# Patient Record
Sex: FEMALE | Race: WHITE | NOT HISPANIC OR LATINO | Employment: UNEMPLOYED | URBAN - METROPOLITAN AREA
[De-identification: names, ages, dates, MRNs, and addresses within clinical notes are randomized per-mention and may not be internally consistent; named-entity substitution may affect disease eponyms.]

---
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
## 2019-01-31 ENCOUNTER — OFFICE VISIT - HEALTHEAST (OUTPATIENT)
Dept: FAMILY MEDICINE | Facility: CLINIC | Age: 19
End: 2019-01-31

## 2019-01-31 DIAGNOSIS — J02.9 VIRAL PHARYNGITIS: ICD-10-CM

## 2019-01-31 DIAGNOSIS — J02.9 SORE THROAT: ICD-10-CM

## 2019-01-31 LAB — DEPRECATED S PYO AG THROAT QL EIA: NORMAL

## 2019-01-31 RX ORDER — DICYCLOMINE HYDROCHLORIDE 10 MG/1
CAPSULE ORAL PRN
Refills: 0 | Status: SHIPPED | COMMUNITY
Start: 2018-07-01 | End: 2022-08-09

## 2019-02-01 LAB — GROUP A STREP BY PCR: NORMAL

## 2019-03-18 ENCOUNTER — OFFICE VISIT - HEALTHEAST (OUTPATIENT)
Dept: FAMILY MEDICINE | Facility: CLINIC | Age: 19
End: 2019-03-18

## 2019-03-18 DIAGNOSIS — N89.8 VAGINAL DISCHARGE: ICD-10-CM

## 2019-03-18 DIAGNOSIS — B37.31 YEAST VAGINITIS: ICD-10-CM

## 2019-03-18 DIAGNOSIS — Z30.42 ENCOUNTER FOR SURVEILLANCE OF INJECTABLE CONTRACEPTIVE: ICD-10-CM

## 2019-03-18 LAB
CLUE CELLS: ABNORMAL
TRICHOMONAS, WET PREP: ABNORMAL
YEAST, WET PREP: ABNORMAL

## 2019-03-18 ASSESSMENT — MIFFLIN-ST. JEOR: SCORE: 1295.45

## 2019-03-19 LAB
C TRACH DNA SPEC QL PROBE+SIG AMP: NEGATIVE
N GONORRHOEA DNA SPEC QL NAA+PROBE: NEGATIVE

## 2019-06-24 ENCOUNTER — AMBULATORY - HEALTHEAST (OUTPATIENT)
Dept: NURSING | Facility: CLINIC | Age: 19
End: 2019-06-24

## 2019-09-16 ENCOUNTER — AMBULATORY - HEALTHEAST (OUTPATIENT)
Dept: NURSING | Facility: CLINIC | Age: 19
End: 2019-09-16

## 2019-12-13 ENCOUNTER — OFFICE VISIT - HEALTHEAST (OUTPATIENT)
Dept: FAMILY MEDICINE | Facility: CLINIC | Age: 19
End: 2019-12-13

## 2019-12-13 DIAGNOSIS — B37.31 YEAST VAGINITIS: ICD-10-CM

## 2019-12-13 DIAGNOSIS — Z30.42 ENCOUNTER FOR SURVEILLANCE OF INJECTABLE CONTRACEPTIVE: ICD-10-CM

## 2019-12-13 DIAGNOSIS — Z20.2 POSSIBLE EXPOSURE TO STD: ICD-10-CM

## 2019-12-13 LAB
CLUE CELLS: ABNORMAL
TRICHOMONAS, WET PREP: ABNORMAL
YEAST, WET PREP: ABNORMAL

## 2019-12-16 LAB
C TRACH DNA SPEC QL PROBE+SIG AMP: NEGATIVE
N GONORRHOEA DNA SPEC QL NAA+PROBE: NEGATIVE

## 2020-05-04 ENCOUNTER — OFFICE VISIT - HEALTHEAST (OUTPATIENT)
Dept: FAMILY MEDICINE | Facility: CLINIC | Age: 20
End: 2020-05-04

## 2020-05-04 DIAGNOSIS — A08.11 GASTROENTERITIS DUE TO NOROVIRUS: ICD-10-CM

## 2020-05-04 RX ORDER — LORAZEPAM 0.5 MG/1
0.5 TABLET ORAL EVERY 6 HOURS PRN
Qty: 10 TABLET | Refills: 0 | Status: SHIPPED | OUTPATIENT
Start: 2020-05-04 | End: 2021-09-29

## 2020-05-05 ENCOUNTER — AMBULATORY - HEALTHEAST (OUTPATIENT)
Dept: LAB | Facility: CLINIC | Age: 20
End: 2020-05-05

## 2020-05-05 ENCOUNTER — AMBULATORY - HEALTHEAST (OUTPATIENT)
Dept: FAMILY MEDICINE | Facility: CLINIC | Age: 20
End: 2020-05-05

## 2020-05-05 DIAGNOSIS — A08.11 GASTROENTERITIS DUE TO NOROVIRUS: ICD-10-CM

## 2020-05-05 LAB
ANION GAP SERPL CALCULATED.3IONS-SCNC: 11 MMOL/L (ref 5–18)
BUN SERPL-MCNC: 11 MG/DL (ref 8–22)
CALCIUM SERPL-MCNC: 9.5 MG/DL (ref 8.5–10.5)
CHLORIDE BLD-SCNC: 102 MMOL/L (ref 98–107)
CO2 SERPL-SCNC: 23 MMOL/L (ref 22–31)
CREAT SERPL-MCNC: 0.73 MG/DL (ref 0.6–1.1)
GFR SERPL CREATININE-BSD FRML MDRD: >60 ML/MIN/1.73M2
GLUCOSE BLD-MCNC: 79 MG/DL (ref 70–125)
MAGNESIUM SERPL-MCNC: 2.2 MG/DL (ref 1.8–2.6)
POTASSIUM BLD-SCNC: 4.1 MMOL/L (ref 3.5–5)
SODIUM SERPL-SCNC: 136 MMOL/L (ref 136–145)

## 2020-06-02 ENCOUNTER — COMMUNICATION - HEALTHEAST (OUTPATIENT)
Dept: FAMILY MEDICINE | Facility: CLINIC | Age: 20
End: 2020-06-02

## 2020-06-04 ENCOUNTER — OFFICE VISIT - HEALTHEAST (OUTPATIENT)
Dept: FAMILY MEDICINE | Facility: CLINIC | Age: 20
End: 2020-06-04

## 2020-06-04 DIAGNOSIS — A08.11 GASTROENTERITIS DUE TO NOROVIRUS: ICD-10-CM

## 2020-06-04 DIAGNOSIS — K29.70 GASTRITIS WITHOUT BLEEDING, UNSPECIFIED CHRONICITY, UNSPECIFIED GASTRITIS TYPE: ICD-10-CM

## 2020-06-04 RX ORDER — METOCLOPRAMIDE 10 MG/1
TABLET ORAL
Qty: 20 TABLET | Refills: 0 | Status: SHIPPED | OUTPATIENT
Start: 2020-06-04 | End: 2021-09-29

## 2020-06-22 ENCOUNTER — AMBULATORY - HEALTHEAST (OUTPATIENT)
Dept: CARE COORDINATION | Facility: CLINIC | Age: 20
End: 2020-06-22

## 2020-06-22 ENCOUNTER — COMMUNICATION - HEALTHEAST (OUTPATIENT)
Dept: NURSING | Facility: CLINIC | Age: 20
End: 2020-06-22

## 2020-06-22 DIAGNOSIS — Z65.9 PSYCHOSOCIAL PROBLEM: ICD-10-CM

## 2020-07-01 ENCOUNTER — COMMUNICATION - HEALTHEAST (OUTPATIENT)
Dept: CARE COORDINATION | Facility: CLINIC | Age: 20
End: 2020-07-01

## 2020-07-06 ENCOUNTER — COMMUNICATION - HEALTHEAST (OUTPATIENT)
Dept: NURSING | Facility: CLINIC | Age: 20
End: 2020-07-06

## 2020-07-17 ENCOUNTER — AMBULATORY - HEALTHEAST (OUTPATIENT)
Dept: CARE COORDINATION | Facility: CLINIC | Age: 20
End: 2020-07-17

## 2020-07-17 DIAGNOSIS — Z78.9 HEALTH CARE HOME, ACTIVE CARE COORDINATION: ICD-10-CM

## 2020-07-20 ENCOUNTER — COMMUNICATION - HEALTHEAST (OUTPATIENT)
Dept: NURSING | Facility: CLINIC | Age: 20
End: 2020-07-20

## 2020-07-24 ENCOUNTER — VIRTUAL VISIT (OUTPATIENT)
Dept: FAMILY MEDICINE | Facility: OTHER | Age: 20
End: 2020-07-24

## 2020-07-26 NOTE — PROGRESS NOTES
"Date: 2020 14:18:48  Clinician: Rich Hopkins  Clinician NPI: [id number]  Patient: Sydney Mcintosh  Patient : 2000  Patient Address: [facility] [address], [place] [address]  Patient Phone: [phone]  Visit Protocol: URI  Patient Summary:  Sydney is a 20 year old ( : 2000 ) female who initiated a Visit for COVID-19 (Coronavirus) evaluation and screening. When asked the question \"Please sign me up to receive news, health information and promotions. \", Sydney responded \"No\".    Sydney states her symptoms started 1-2 days ago.   Her symptoms consist of nausea, diarrhea, myalgia, a sore throat, a cough, nasal congestion, rhinitis, malaise, and a headache. She is experiencing difficulty breathing due to nasal congestion but she is not short of breath.   Symptom details     Nasal secretions: The color of her mucus is green.    Cough: Sydney coughs every 5-10 minutes and her cough is more bothersome at night. Phlegm comes into her throat when she coughs. She does not believe her cough is caused by post-nasal drip. The color of the phlegm is green.     Sore throat: Sydney reports having unbearable throat pain (10 on a 10 point pain scale), does not have exudate on her tonsils, and can swallow liquids. She is not sure if the lymph nodes in her neck are enlarged. A rash has not appeared on the skin since the sore throat started.     Headache: She states the headache is severe (7-9 on a 10 point pain scale).      Sydney denies having wheezing, teeth pain, ageusia, anosmia, facial pain or pressure, fever, vomiting, ear pain, and chills. She also denies having recent facial or sinus surgery in the past 60 days, taking antibiotic medication in the past month, and having a sinus infection within the past year.   Precipitating events  Within the past week, Sydney has not been exposed to someone with strep throat. She has not recently been exposed to someone with influenza. Sydney has been in close contact " with the following high risk individuals: pregnant women and children under the age of 5.   Pertinent COVID-19 (Coronavirus) information  In the past 14 days, Sydney has not worked in a congregate living setting.   She does not work or volunteer as healthcare worker or a  and does not work or volunteer in a healthcare facility.   Sydney also has not lived in a congregate living setting in the past 14 days. She does not live with a healthcare worker.   Sydney has not had a close contact with a laboratory-confirmed COVID-19 patient within 14 days of symptom onset.   Pertinent medical history  Sydney typically gets a yeast infection when she takes antibiotics. She has used fluconazole (Diflucan) to treat previous yeast infections. 1 dose of fluconazole (Diflucan) has typically been sufficient for symptoms to resolve in the past.   Syndey does not need a return to work/school note.   Weight: 130 lbs   Sydney does not smoke or use smokeless tobacco.   She denies pregnancy and denies breastfeeding. Her last period was over a month ago.   Weight: 130 lbs    MEDICATIONS: No current medications, ALLERGIES: amoxicillin  Clinician Response:  Dear Sydney,   Your symptoms show that you may have coronavirus (COVID-19). This illness can cause fever, cough and trouble breathing. Many people get a mild case and get better on their own. Some people can get very sick.  What should I do?  We would like to test you for this virus.   1. Please call [phone] to schedule your visit. Explain that you were referred by [facility] to have a COVID-19 test. Be ready to share your [facility] visit ID number.  The following will serve as your written order for this COVID Test, ordered by me, for the indication of suspected COVID [Z20.828]: The test will be ordered in [facility], our electronic health record, after you are scheduled. It will show as ordered and authorized by David Georges MD.  Order: COVID-19 (Coronavirus) PCR for SYMPTOMATIC  "testing from [facility].      2. When it's time for your COVID test:  Stay at least 6 feet away from others. (If someone will drive you to your test, stay in the backseat, as far away from the  as you can.)   Cover your mouth and nose with a mask, tissue or washcloth.  Go straight to the testing site. Don't make any stops on the way there or back.      3.Starting now: Stay home and away from others (self-isolate) until:   You've had no fever---and no medicine that reduces fever---for 3 full days (72 hours). And...   Your other symptoms have gotten better. For example, your cough or breathing has improved. And...   At least 10 days have passed since your symptoms started.       During this time, don't leave the house except for testing or medical care.   Stay in your own room, even for meals. Use your own bathroom if you can.   Stay away from others in your home. No hugging, kissing or shaking hands. No visitors.  Don't go to work, school or anywhere else.    Clean \"high touch\" surfaces often (doorknobs, counters, handles, etc.). Use a household cleaning spray or wipes. You'll find a full list of  on the EPA website: www.epa.gov/pesticide-registration/list-n-disinfectants-use-against-sars-cov-2.   Cover your mouth and nose with a mask, tissue or washcloth to avoid spreading germs.  Wash your hands and face often. Use soap and water.  Caregivers in these groups are at risk for severe illness due to COVID-19:  o People 65 years and older  o People who live in a nursing home or long-term care facility  o People with chronic disease (lung, heart, cancer, diabetes, kidney, liver, immunologic)  o People who have a weakened immune system, including those who:   Are in cancer treatment  Take medicine that weakens the immune system, such as corticosteroids  Had a bone marrow or organ transplant  Have an immune deficiency  Have poorly controlled HIV or AIDS  Are obese (body mass index of 40 or higher)  Smoke " regularly   o Caregivers should wear gloves while washing dishes, handling laundry and cleaning bedrooms and bathrooms.  o Use caution when washing and drying laundry: Don't shake dirty laundry, and use the warmest water setting that you can.  o For more tips, go to www.cdc.gov/coronavirus/2019-ncov/downloads/10Things.pdf.    4.Sign up for [employer]. We know it's scary to hear that you might have COVID-19. We want to track your symptoms to make sure you're okay over the next 2 weeks. Please look for an email from [employer]---this is a free, online program that we'll use to keep in touch. To sign up, follow the link in the email. Learn more at [email or web address]  How can I take care of myself?   Get lots of rest. Drink extra fluids (unless a doctor has told you not to).   Take Tylenol (acetaminophen) for fever or pain. If you have liver or kidney problems, ask your family doctor if it's okay to take Tylenol.   Adults can take either:    650 mg (two 325 mg pills) every 4 to 6 hours, or...   1,000 mg (two 500 mg pills) every 8 hours as needed.    Note: Don't take more than 3,000 mg in one day. Acetaminophen is found in many medicines (both prescribed and over-the-counter medicines). Read all labels to be sure you don't take too much.   For children, check the Tylenol bottle for the right dose. The dose is based on the child's age or weight.    If you have other health problems (like cancer, heart failure, an organ transplant or severe kidney disease): Call your specialty clinic if you don't feel better in the next 2 days.       Know when to call 911. Emergency warning signs include:    Trouble breathing or shortness of breath Pain or pressure in the chest that doesn't go away Feeling confused like you haven't felt before, or not being able to wake up Bluish-colored lips or face.  Where can I get more information?    [employer] [place] -- About COVID-19: [email or web address]   CDC -- What  "to Do If You're Sick: www.cdc.gov/coronavirus/2019-ncov/about/steps-when-sick.html   CDC -- Ending Home Isolation: www.cdc.gov/coronavirus/2019-ncov/hcp/disposition-in-home-patients.html   [facility] -- Caring for Someone: www.cdc.gov/coronavirus/2019-ncov/if-you-are-sick/care-for-someone.html   [facility] -- Interim Guidance for Hospital Discharge to Home: www.health.[facility].[place]./diseases/coronavirus/hcp/hospdischarge.pdf   [facility] clinical trials (COVID-19 research studies): clinicalaffairs.[facility]-clinical-trials    Below are the COVID-19 hotlines at the [place] Department of Health ([facility]). Interpreters are available.    For health questions: Call 191-463-8803 or 1-487.759.8894 (7 a.m. to 7 p.m.) For questions about schools and childcare: Call 648-321-4573 or 1-707.870.3436 (7 a.m. to 7 p.m.)       Rapid Strep Test - [place]    I am sorry you are not feeling well. Based on the information provided, it is possible you have a bacterial infection called strep throat. When left untreated, strep can spread to other areas of the body and cause a more serious infection.  You will need to have a strep test before your diagnosis and treatment plan can be completed. A swab of the back of your throat is collected in a lab and tested for the bacteria that causes strep.  I provided a ZipTicket (lab order) to have the test done in a lab, so you can receive your treatment plan as soon as possible.  Click \"Continue zipticket\" button below to get started.  Instructions:     Once you have clicked \"Continue zipticket\" below, you will be prompted to select the most convenient clinic on the next screen    Once you select a clinic of your choosing, click \"Activate\" button to activate the lab order    Print out or write down the check-in code    Bring the check-in code to the clinic    Check in at the  (an appointment is not needed)    Depending on the number of patients at the lab, the visit should take roughly 10-15 " minutes     You will receive an email letting you know when your lab results and treatment plan are available.   Diagnosis: Pain in throat  Diagnosis ICD: R07.0  ZipTicket Results: Rapid Strep Test - [place] - Activation needed  [place] Secondary Results: null

## 2020-07-29 NOTE — PROGRESS NOTES
"Date: 2020 14:18:48  Clinician: Rich Hopkins  Clinician NPI: [id number]  Patient: Sydney Mcintosh  Patient : 2000  Patient Address: [facility] [address], [place] [address]  Patient Phone: [phone]  Visit Protocol: URI  Patient Summary:  Sydney is a 20 year old ( : 2000 ) female who initiated a Visit for COVID-19 (Coronavirus) evaluation and screening. When asked the question \"Please sign me up to receive news, health information and promotions. \", Sydney responded \"No\".    Sydney states her symptoms started 1-2 days ago.   Her symptoms consist of nausea, diarrhea, myalgia, a sore throat, a cough, nasal congestion, rhinitis, malaise, and a headache. She is experiencing difficulty breathing due to nasal congestion but she is not short of breath.   Symptom details     Nasal secretions: The color of her mucus is green.    Cough: Sydney coughs every 5-10 minutes and her cough is more bothersome at night. Phlegm comes into her throat when she coughs. She does not believe her cough is caused by post-nasal drip. The color of the phlegm is green.     Sore throat: Sydney reports having unbearable throat pain (10 on a 10 point pain scale), does not have exudate on her tonsils, and can swallow liquids. She is not sure if the lymph nodes in her neck are enlarged. A rash has not appeared on the skin since the sore throat started.     Headache: She states the headache is severe (7-9 on a 10 point pain scale).      Sydney denies having wheezing, teeth pain, ageusia, anosmia, facial pain or pressure, fever, vomiting, ear pain, and chills. She also denies having recent facial or sinus surgery in the past 60 days, taking antibiotic medication in the past month, and having a sinus infection within the past year.   Precipitating events  Within the past week, Sydney has not been exposed to someone with strep throat. She has not recently been exposed to someone with influenza. Sydney has been in close contact " with the following high risk individuals: pregnant women and children under the age of 5.   Pertinent COVID-19 (Coronavirus) information  In the past 14 days, Sydney has not worked in a congregate living setting.   She does not work or volunteer as healthcare worker or a  and does not work or volunteer in a healthcare facility.   Sydney also has not lived in a congregate living setting in the past 14 days. She does not live with a healthcare worker.   Sydney has not had a close contact with a laboratory-confirmed COVID-19 patient within 14 days of symptom onset.   Pertinent medical history  Sydney typically gets a yeast infection when she takes antibiotics. She has used fluconazole (Diflucan) to treat previous yeast infections. 1 dose of fluconazole (Diflucan) has typically been sufficient for symptoms to resolve in the past.   Sydney does not need a return to work/school note.   Weight: 130 lbs   Sydney does not smoke or use smokeless tobacco.   She denies pregnancy and denies breastfeeding. Her last period was over a month ago.   Weight: 130 lbs    MEDICATIONS: No current medications, ALLERGIES: amoxicillin  Clinician Response:  Dear Sydney,   Your symptoms show that you may have coronavirus (COVID-19). This illness can cause fever, cough and trouble breathing. Many people get a mild case and get better on their own. Some people can get very sick.  What should I do?  We would like to test you for this virus.   1. Please call [phone] to schedule your visit. Explain that [facility] were [facility] by [facility] to have a COVID-19 test. Be ready to share your [facility] visit ID number.  The following will serve as your written order for this COVID Test, ordered by me, for the indication of suspected COVID [Z20.828]: The test will be ordered in [facility], our electronic health record, after you are scheduled. It will show as ordered and authorized by David Georges MD.  Order: COVID-19 (Coronavirus) PCR for SYMPTOMATIC  "testing from [facility].      2. When it's time for your COVID test:  Stay at least 6 feet away from others. (If someone will drive you to your test, stay in the backseat, as far away from the  as you can.)   Cover your mouth and nose with a mask, tissue or washcloth.  Go straight to the testing site. Don't make any stops on the way there or back.      3.Starting now: Stay home and away from others (self-isolate) until:   You've had no fever---and no medicine that reduces fever---for 3 full days (72 hours). And...   Your other symptoms have gotten better. For example, your cough or breathing has improved. And...   At least 10 days have passed since your symptoms started.       During this time, don't leave the house except for testing or medical care.   Stay in your own room, even for meals. Use your own bathroom if you can.   Stay away from others in your home. No hugging, kissing or shaking hands. No visitors.  Don't go to work, school or anywhere else.    Clean \"high touch\" surfaces often (doorknobs, counters, handles, etc.). Use a household cleaning spray or wipes. You'll find a full list of  on the EPA website: www.epa.gov/pesticide-registration/list-n-disinfectants-use-against-sars-cov-2.   Cover your mouth and nose with a mask, tissue or washcloth to avoid spreading germs.  Wash your hands and face often. Use soap and water.  Caregivers in these groups are at risk for severe illness due to COVID-19:  o People 65 years and older  o People who live in a nursing home or long-term care facility  o People with chronic disease (lung, heart, cancer, diabetes, kidney, liver, immunologic)  o People who have a weakened immune system, including those who:   Are in cancer treatment  Take medicine that weakens the immune system, such as corticosteroids  Had a bone marrow or organ transplant  Have an immune deficiency  Have poorly controlled HIV or AIDS  Are obese (body mass index of 40 or higher)  Smoke " regularly   o Caregivers should wear gloves while washing dishes, handling laundry and cleaning bedrooms and bathrooms.  o Use caution when washing and drying laundry: Don't shake dirty laundry, and use the warmest water setting that you can.  o For more tips, go to www.cdc.gov/coronavirus/2019-ncov/downloads/10Things.pdf.    4.Sign up for [employer]. We know it's scary to hear that you might have COVID-19. We want to track your symptoms to make sure you're okay over the next 2 weeks. Please look for an email from [employer]---this is a free, online program that we'll use to keep in touch. To sign up, follow the link in the email. Learn more at [email or web address]  How can I take care of myself?   Get lots of rest. Drink extra fluids (unless a doctor has told you not to).   Take Tylenol (acetaminophen) for fever or pain. If you have liver or kidney problems, ask your family doctor if it's okay to take Tylenol.   Adults can take either:    650 mg (two 325 mg pills) every 4 to 6 hours, or...   1,000 mg (two 500 mg pills) every 8 hours as needed.    Note: Don't take more than 3,000 mg in one day. Acetaminophen is found in many medicines (both prescribed and over-the-counter medicines). Read all labels to be sure you don't take too much.   For children, check the Tylenol bottle for the right dose. The dose is based on the child's age or weight.    If you have other health problems (like cancer, heart failure, an organ transplant or severe kidney disease): Call your specialty clinic if you don't feel better in the next 2 days.       Know when to call 911. Emergency warning signs include:    Trouble breathing or shortness of breath Pain or pressure in the chest that doesn't go away Feeling confused like you haven't felt before, or not being able to wake up Bluish-colored lips or face.  Where can I get more information?    [employer] [place] -- About COVID-19: [email or web address]   CDC -- What  to Do If You're Sick: www.cdc.gov/coronavirus/2019-ncov/about/steps-when-sick.html   CDC -- Ending Home Isolation: www.cdc.gov/coronavirus/2019-ncov/hcp/disposition-in-home-patients.html   [facility] -- Caring for Someone: www.cdc.gov/coronavirus/2019-ncov/if-you-are-sick/care-for-someone.html   [facility] -- Interim Guidance for Hospital Discharge to Home: www.health.[facility].[place]./diseases/coronavirus/hcp/hospdischarge.pdf   [facility] clinical trials (COVID-19 research studies): clinicalaffairs.[facility]-clinical-trials    Below are the COVID-19 hotlines at the [place] Department of Health ([facility]). Interpreters are available.    For health questions: Call 249-209-7390 or 1-686.996.3464 (7 a.m. to 7 p.m.) For questions about schools and childcare: Call 427-843-6307 or 1-699.699.9274 (7 a.m. to 7 p.m.)       Rapid Strep Test - [place]    I am sorry you are not feeling well. Your strep test has . Based on the information provided, it is possible that you could have strep throat. When left untreated, strep can spread to other areas of the body and cause a more serious infection.  A strep test is the only way to determine if a bacterial infection or a virus is causing your sore throat. This is done in a lab where a swab of the back of your throat is collected tested for the bacteria that causes strep.  Since you chose not to use the lab order, please be seen:     In a clinic or urgent care    Within 24 hours     Call 911 or go to the emergency room if you suddenly develop a rash, are drooling or unable to swallow fluids, if you are having difficulty breathing, or feel that your throat is closing off.   Diagnosis: Pain in throat  Diagnosis ICD: R07.0  ZipTicket Results: Rapid Strep Test - [place] -   ZipTicket Secondary Results: null

## 2020-08-14 ENCOUNTER — AMBULATORY - HEALTHEAST (OUTPATIENT)
Dept: FAMILY MEDICINE | Facility: CLINIC | Age: 20
End: 2020-08-14

## 2020-08-14 DIAGNOSIS — Z30.40 CONTRACEPTION, GENERIC SURVEILLANCE: ICD-10-CM

## 2020-08-18 ENCOUNTER — AMBULATORY - HEALTHEAST (OUTPATIENT)
Dept: NURSING | Facility: CLINIC | Age: 20
End: 2020-08-18

## 2020-08-18 DIAGNOSIS — Z30.40 CONTRACEPTION, GENERIC SURVEILLANCE: ICD-10-CM

## 2020-08-18 LAB — HCG UR QL: NEGATIVE

## 2020-09-04 ENCOUNTER — AMBULATORY - HEALTHEAST (OUTPATIENT)
Dept: CARE COORDINATION | Facility: CLINIC | Age: 20
End: 2020-09-04

## 2020-09-04 DIAGNOSIS — Z78.9 HEALTH CARE HOME, ACTIVE CARE COORDINATION: ICD-10-CM

## 2020-09-08 ENCOUNTER — COMMUNICATION - HEALTHEAST (OUTPATIENT)
Dept: NURSING | Facility: CLINIC | Age: 20
End: 2020-09-08

## 2020-09-15 ENCOUNTER — COMMUNICATION - HEALTHEAST (OUTPATIENT)
Dept: CARE COORDINATION | Facility: CLINIC | Age: 20
End: 2020-09-15

## 2020-09-17 ENCOUNTER — OFFICE VISIT - HEALTHEAST (OUTPATIENT)
Dept: FAMILY MEDICINE | Facility: CLINIC | Age: 20
End: 2020-09-17

## 2020-09-17 DIAGNOSIS — K14.0 GLOSSITIS: ICD-10-CM

## 2020-09-17 DIAGNOSIS — R20.0 NUMBNESS OF FINGERS OF BOTH HANDS: ICD-10-CM

## 2020-09-21 ENCOUNTER — COMMUNICATION - HEALTHEAST (OUTPATIENT)
Dept: NURSING | Facility: CLINIC | Age: 20
End: 2020-09-21

## 2020-12-22 ENCOUNTER — OFFICE VISIT - HEALTHEAST (OUTPATIENT)
Dept: FAMILY MEDICINE | Facility: CLINIC | Age: 20
End: 2020-12-22

## 2020-12-22 DIAGNOSIS — R11.0 NAUSEA: ICD-10-CM

## 2020-12-22 DIAGNOSIS — Z30.42 ENCOUNTER FOR SURVEILLANCE OF INJECTABLE CONTRACEPTIVE: ICD-10-CM

## 2020-12-22 DIAGNOSIS — R55 VASOVAGAL SYNCOPE: ICD-10-CM

## 2020-12-22 DIAGNOSIS — N89.8 VAGINAL DISCHARGE: ICD-10-CM

## 2020-12-22 DIAGNOSIS — Z12.4 SCREENING FOR CERVICAL CANCER: ICD-10-CM

## 2020-12-22 LAB
CLUE CELLS: ABNORMAL
HCG UR QL: NEGATIVE
TRICHOMONAS, WET PREP: ABNORMAL
YEAST, WET PREP: ABNORMAL

## 2020-12-22 RX ORDER — ONDANSETRON 4 MG/1
4 TABLET, ORALLY DISINTEGRATING ORAL EVERY 12 HOURS PRN
Qty: 20 TABLET | Refills: 0 | Status: SHIPPED | OUTPATIENT
Start: 2020-12-22 | End: 2021-09-29

## 2020-12-22 ASSESSMENT — MIFFLIN-ST. JEOR: SCORE: 1364.06

## 2020-12-24 LAB
C TRACH DNA SPEC QL PROBE+SIG AMP: NEGATIVE
HPV SOURCE: ABNORMAL
HUMAN PAPILLOMA VIRUS 16 DNA: NEGATIVE
HUMAN PAPILLOMA VIRUS 18 DNA: NEGATIVE
HUMAN PAPILLOMA VIRUS FINAL DIAGNOSIS: ABNORMAL
HUMAN PAPILLOMA VIRUS OTHER HR: POSITIVE
N GONORRHOEA DNA SPEC QL NAA+PROBE: NEGATIVE
SPECIMEN DESCRIPTION: ABNORMAL

## 2021-01-04 ENCOUNTER — COMMUNICATION - HEALTHEAST (OUTPATIENT)
Dept: FAMILY MEDICINE | Facility: CLINIC | Age: 21
End: 2021-01-04

## 2021-01-04 DIAGNOSIS — B37.31 YEAST VAGINITIS: ICD-10-CM

## 2021-01-04 RX ORDER — FLUCONAZOLE 150 MG/1
TABLET ORAL
Qty: 1 TABLET | Refills: 0 | Status: SHIPPED | OUTPATIENT
Start: 2021-01-04 | End: 2021-07-23

## 2021-01-07 LAB
BKR LAB AP ABNORMAL BLEEDING: NO
BKR LAB AP BIRTH CONTROL/HORMONES: NORMAL
BKR LAB AP CERVICAL APPEARANCE: NORMAL
BKR LAB AP GYN ADEQUACY: NORMAL
BKR LAB AP GYN INTERPRETATION: NORMAL
BKR LAB AP GYN OTHER FINDINGS: NORMAL
BKR LAB AP HPV REFLEX: NORMAL
BKR LAB AP LMP: NORMAL
BKR LAB AP PATIENT STATUS: NORMAL
BKR LAB AP PREVIOUS ABNORMAL: NORMAL
BKR LAB AP PREVIOUS NORMAL: NORMAL
HIGH RISK?: NO
PATH REPORT.COMMENTS IMP SPEC: NORMAL
RESULT FLAG (HE HISTORICAL CONVERSION): NORMAL

## 2021-04-30 ENCOUNTER — AMBULATORY - HEALTHEAST (OUTPATIENT)
Dept: NURSING | Facility: CLINIC | Age: 21
End: 2021-04-30

## 2021-04-30 DIAGNOSIS — Z78.9 USES BIRTH CONTROL: ICD-10-CM

## 2021-04-30 LAB — HCG UR QL: NEGATIVE

## 2021-05-27 NOTE — PROGRESS NOTES
ASSESSMENT/PLAN:  1. Vaginal discharge  Wet Prep, Vaginal    Chlamydia trachomatis & Neisseria gonorrhoeae, Amplified Detection   2. Yeast vaginitis  fluconazole (DIFLUCAN) 150 MG tablet   3. Encounter for surveillance of injectable contraceptive         This is a 19 yo female with:  1.  Vaginal discharge - no itching - wet prep and GC/Chlam testing was sent  2.  Yeast vaginitis - wet prep positive for yeast - will treat with Fluconazole  3.  Due for Depo today - given  No Follow-up on file.  Administrations This Visit     medroxyPROGESTERone injection 150 mg (DEPO-PROVERA)     Admin Date  03/18/2019 Action  Given Dose  150 mg Route  Intramuscular Administered By  Marleny Glasgow CMA                Medications Discontinued During This Encounter   Medication Reason     azithromycin (ZITHROMAX) 500 MG tablet Therapy completed     ondansetron (ZOFRAN) 4 MG tablet Duplicate order     sucralfate (CARAFATE) 1 gram tablet Therapy completed     There are no Patient Instructions on file for this visit.    Chief Complaint:  Chief Complaint   Patient presents with     STD screen - Depo inj       HPI:   Sydney Mcintosh is a 18 y.o. female c/o  1.  Due for Depo  2.  Vaginal discharge - no new partners  No pelvic pain  No fever  Denies pregnancy    PMH:   Patient Active Problem List    Diagnosis Date Noted     Chlamydia 09/17/2018     Contraception management - Depo 05/16/2018     Curvature Of Spine (Acquired) (Idiopathic)      Dysmenorrhea      Past Medical History:   Diagnosis Date     Chlamydia infection 05/2018     Past Surgical History:   Procedure Laterality Date     TONSILLECTOMY  2002     Social History     Socioeconomic History     Marital status: Single     Spouse name: Not on file     Number of children: Not on file     Years of education: Not on file     Highest education level: Not on file   Occupational History     Occupation: student - [employer]     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[employer]); [employer]   Social Needs      Financial resource strain: Not on file     Food insecurity:     Worry: Not on file     Inability: Not on file     Transportation needs:     Medical: Not on file     Non-medical: Not on file   Tobacco Use     Smoking status: Never Smoker     Smokeless tobacco: Never Used   Substance and Sexual Activity     Alcohol use: No     Drug use: No     Sexual activity: Yes     Partners: Male     Birth control/protection: Injection   Lifestyle     Physical activity:     Days per week: Not on file     Minutes per session: Not on file     Stress: Not on file   Relationships     Social connections:     Talks on phone: Not on file     Gets together: Not on file     Attends Pentecostal service: Not on file     Active member of club or organization: Not on file     Attends meetings of clubs or organizations: Not on file     Relationship status: Not on file     Intimate partner violence:     Fear of current or ex partner: Not on file     Emotionally abused: Not on file     Physically abused: Not on file     Forced sexual activity: Not on file   Other Topics Concern     Not on file   Social History Narrative    Lives at home     Family History   Problem Relation Age of Onset     Endometriosis Mother      Thyroid disease Maternal Grandmother        Meds:    Current Outpatient Medications:      ammonium lactate (LAC-HYDRIN) 12 % lotion, Apply topically as needed for dry skin., Disp: , Rfl:      cetirizine (ZYRTEC) 10 MG tablet, Take 10 mg by mouth as needed.    , Disp: , Rfl:      dicyclomine (BENTYL) 10 MG capsule, as needed.    , Disp: , Rfl: 0     omeprazole (PRILOSEC) 20 MG capsule, as needed.    , Disp: , Rfl: 0     ondansetron (ZOFRAN ODT) 4 MG disintegrating tablet, Take 1 tablet (4 mg total) by mouth every 8 (eight) hours as needed for nausea., Disp: 8 tablet, Rfl: 0     triamcinolone acetonide 0.025 % Lotn, Apply topically., Disp: , Rfl:     Current Facility-Administered Medications:      medroxyPROGESTERone injection 150 mg  "(DEPO-PROVERA), 150 mg, Intramuscular, Q3 Months, Alicia Hall PA-C, 150 mg at 03/18/19 1546    Allergies:  Allergies   Allergen Reactions     Amoxicillin Rash       ROS:  Pertinent positives as noted in HPI; otherwise 12 point ROS negative.      Physical Exam:  EXAM:  /60   Pulse 84   Resp 16   Ht 5' 1.5\" (1.562 m)   Wt 129 lb (58.5 kg)   BMI 23.98 kg/m     Gen:  NAD, appears well, well-hydrated  Abd:  Soft, nontender, BS+, no masses, no guarding or rebound, no HSM  PELVIC EXAM:External genitalia: normal  Vaginal mucosa normal  Vaginal discharge: white thick  Speculum exam shows a normal appearing cervix .   Extr:  Neg.  Neuro:  No asymmetry  Skin:  Warm/dry        Results:  Results for orders placed or performed in visit on 03/18/19   Wet Prep, Vaginal   Result Value Ref Range    Yeast Result Yeast Seen (!) No yeast seen    Trichomonas No Trichomonas seen No Trichomonas seen    Clue Cells, Wet Prep No Clue cells seen No Clue cells seen   Chlamydia trachomatis & Neisseria gonorrhoeae, Amplified Detection   Result Value Ref Range    Chlamydia trachomatis, Amplified Detection Negative Negative    Neisseria gonorrhoeae, Amplified Detection Negative Negative             "

## 2021-05-29 ENCOUNTER — RECORDS - HEALTHEAST (OUTPATIENT)
Dept: ADMINISTRATIVE | Facility: CLINIC | Age: 21
End: 2021-05-29

## 2021-05-30 VITALS — WEIGHT: 118.9 LBS

## 2021-05-31 VITALS — WEIGHT: 107.7 LBS | BODY MASS INDEX: 19.82 KG/M2 | HEIGHT: 62 IN

## 2021-05-31 VITALS — HEIGHT: 61 IN | WEIGHT: 111.6 LBS | BODY MASS INDEX: 21.07 KG/M2

## 2021-06-01 VITALS — WEIGHT: 115 LBS | BODY MASS INDEX: 21.38 KG/M2

## 2021-06-01 VITALS — WEIGHT: 113.7 LBS | BODY MASS INDEX: 21.14 KG/M2

## 2021-06-01 VITALS — BODY MASS INDEX: 22.55 KG/M2 | WEIGHT: 121.31 LBS

## 2021-06-01 VITALS — WEIGHT: 116 LBS | BODY MASS INDEX: 21.56 KG/M2

## 2021-06-02 ENCOUNTER — RECORDS - HEALTHEAST (OUTPATIENT)
Dept: ADMINISTRATIVE | Facility: CLINIC | Age: 21
End: 2021-06-02

## 2021-06-02 VITALS — WEIGHT: 124 LBS | HEIGHT: 62 IN | BODY MASS INDEX: 22.82 KG/M2

## 2021-06-02 VITALS — WEIGHT: 129 LBS | BODY MASS INDEX: 23.74 KG/M2 | HEIGHT: 62 IN

## 2021-06-02 VITALS — BODY MASS INDEX: 23.42 KG/M2 | WEIGHT: 126 LBS

## 2021-06-04 VITALS
HEART RATE: 88 BPM | DIASTOLIC BLOOD PRESSURE: 62 MMHG | WEIGHT: 129.1 LBS | BODY MASS INDEX: 24 KG/M2 | SYSTOLIC BLOOD PRESSURE: 144 MMHG | RESPIRATION RATE: 16 BRPM

## 2021-06-04 NOTE — PROGRESS NOTES
ASSESSMENT/PLAN:  1. Possible exposure to STD  Chlamydia trachomatis & Neisseria gonorrhoeae, Amplified Detection    Wet Prep, Vaginal   2. Yeast vaginitis  fluconazole (DIFLUCAN) 150 MG tablet   3. Encounter for surveillance of injectable contraceptive         This is a 20 yo female with:  1.  Possible exposure to STD - despite the fact that she is apparently in a monogamous relationship (living with her partner, thinking about looking for housing with this partner), she reports that he may be with his old partner as well (they have a daughter together).  Wet prep, GC/Chlamydia swabs are sent today.    2.  Yeast vaginitis - patient's wet prep is positive for yeast vaginitis - Fluconazole 150 mg po x 1 dose was sent to her pharmacy today.   3.  Due for Depo Provera - given today  Return in about 3 months (around 3/13/2020) for Depo Provera.  Administrations This Visit     medroxyPROGESTERone injection 150 mg (DEPO-PROVERA)     Admin Date  12/13/2019 Action  Given Dose  150 mg Route  Intramuscular Administered By  Lisa Fleming MA                There are no discontinued medications.  There are no Patient Instructions on file for this visit.    Chief Complaint:  Chief Complaint   Patient presents with     STI     Other     depo shot       HPI:   Sydney Mcintosh is a 19 y.o. 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  Lives in mom's house with her partner, but does have concern that her partner may be with his old girlfriend as well  She notes she has been monogamous to this individual -   Wants swabs sent for STD screening    PMH:   Patient Active Problem List    Diagnosis Date Noted     Chlamydia 09/17/2018     Contraception management - Depo 05/16/2018     Curvature Of Spine (Acquired) (Idiopathic)      Dysmenorrhea      Past Medical History:   Diagnosis Date     Chlamydia infection 05/2018     Past Surgical History:   Procedure Laterality Date     TONSILLECTOMY  2002     Social  History     Socioeconomic History     Marital status: Single     Spouse name: Not on file     Number of children: Not on file     Years of education: Not on file     Highest education level: Not on file   Occupational History     Occupation: student - [employer]     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[employer]; [employer]   Social Needs     Financial resource strain: Not on file     Food insecurity:     Worry: Not on file     Inability: Not on file     Transportation needs:     Medical: Not on file     Non-medical: Not on file   Tobacco Use     Smoking status: Never Smoker     Smokeless tobacco: Never Used   Substance and Sexual Activity     Alcohol use: No     Drug use: No     Sexual activity: Yes     Partners: Male     Birth control/protection: Injection   Lifestyle     Physical activity:     Days per week: Not on file     Minutes per session: Not on file     Stress: Not on file   Relationships     Social connections:     Talks on phone: Not on file     Gets together: Not on file     Attends Synagogue service: Not on file     Active member of club or organization: Not on file     Attends meetings of clubs or organizations: Not on file     Relationship status: Not on file     Intimate partner violence:     Fear of current or ex partner: Not on file     Emotionally abused: Not on file     Physically abused: Not on file     Forced sexual activity: Not on file   Other Topics Concern     Not on file   Social History Narrative    Lives at home     Family History   Problem Relation Age of Onset     Endometriosis Mother      Thyroid disease Maternal Grandmother        Meds:    Current Outpatient Medications:      ammonium lactate (LAC-HYDRIN) 12 % lotion, Apply topically as needed for dry skin., Disp: , Rfl:      triamcinolone acetonide 0.025 % Lotn, Apply topically., Disp: , Rfl:      cetirizine (ZYRTEC) 10 MG tablet, Take 10 mg by mouth as needed.    , Disp: , Rfl:      dicyclomine (BENTYL) 10 MG capsule, as needed.    , Disp: ,  Rfl: 0     omeprazole (PRILOSEC) 20 MG capsule, as needed.    , Disp: , Rfl: 0     ondansetron (ZOFRAN ODT) 4 MG disintegrating tablet, Take 1 tablet (4 mg total) by mouth every 8 (eight) hours as needed for nausea., Disp: 8 tablet, Rfl: 0    Current Facility-Administered Medications:      medroxyPROGESTERone injection 150 mg (DEPO-PROVERA), 150 mg, Intramuscular, Q3 Months, Alicia Hall PA-C, 150 mg at 12/13/19 1137    Allergies:  Allergies   Allergen Reactions     Amoxicillin Rash       ROS:  Pertinent positives as noted in HPI; otherwise 12 point ROS negative.      Physical Exam:  EXAM:  /62 (Patient Site: Left Arm, Patient Position: Sitting, Cuff Size: Adult Regular)   Pulse 88   Resp 16   Wt 129 lb 1.6 oz (58.6 kg)   BMI 24.00 kg/m     Gen:  NAD, appears well, well-hydrated  HEENT:  TMs nl, oropharynx benign, nasal mucosa nl, conjunctiva clear  Neck:  Supple, no adenopathy, no thyromegaly, no carotid bruits, no JVD  Lungs:  Clear to auscultation bilaterally  Cor:  RRR no murmur  Abd:  Soft, nontender, BS+, no masses, no guarding or rebound, no HSM  PELVIC EXAM:External genitalia: normal  Vaginal mucosa normal  Vaginal discharge: white/yellow mod thick  Speculum exam shows a normal appearing cervix .   Bimanual exam: Cervix closed, firm, non tender  to motion.  Uterus  firm, regular, mobile, non tender to palpation. No adnexal masses or tenderness.   Extr:  Neg.  Neuro:  No asymmetry  Skin:  Warm/dry        Results:  Results for orders placed or performed in visit on 12/13/19   Wet Prep, Vaginal   Result Value Ref Range    Yeast Result Yeast Seen (!) No yeast seen    Trichomonas No Trichomonas seen No Trichomonas seen    Clue Cells, Wet Prep No Clue cells seen No Clue cells seen

## 2021-06-05 VITALS
WEIGHT: 145 LBS | HEIGHT: 61 IN | HEART RATE: 84 BPM | BODY MASS INDEX: 27.38 KG/M2 | SYSTOLIC BLOOD PRESSURE: 122 MMHG | TEMPERATURE: 99.1 F | RESPIRATION RATE: 16 BRPM | DIASTOLIC BLOOD PRESSURE: 58 MMHG

## 2021-06-07 NOTE — PROGRESS NOTES
"Sydney Mcintosh is a 19 y.o. female who is being evaluated via a billable telephone visit.      The patient has been notified of following:     \"This telephone visit will be conducted via a call between you and your physician/provider. We have found that certain health care needs can be provided without the need for a physical exam.  This service lets us provide the care you need with a short phone conversation.  If a prescription is necessary we can send it directly to your pharmacy.  If lab work is needed we can place an order for that and you can then stop by our lab to have the test done at a later time.    Telephone visits are billed at different rates depending on your insurance coverage. During this emergency period, for some insurers they may be billed the same as an in-person visit.  Please reach out to your insurance provider with any questions.    If during the course of the call the physician/provider feels a telephone visit is not appropriate, you will not be charged for this service.\"    Patient has given verbal consent to a Telephone visit? Yes    What phone number would you like to be contacted at? [phone]    Patient would like to receive their AVS by AVS Preference: Mail a copy.    Additional provider notes:   ASSESSMENT/PLAN:  1. 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  She was hospitalized last week at [facility] for Norovirus infection.  She is still having some cramping pain and occasional loose stools.  She was told to follow up with labs this week - BMP, Mg.  She also is worried about return to work before her symptoms improve - and requests refill on her prescriptions (Metoclopramide, Ondansetron, Lorazepam).  We will set this up for refills.  We will set up a lab appointment for her as well for tomorrow - she would like to come in early in the afternoon.      Return in about 1 week (around 5/11/2020) for if not getting " better.      There are no discontinued medications.  There are no Patient Instructions on file for this visit.    Chief Complaint:  Chief Complaint   Patient presents with     Follow-up     needs labs ER F/U       HPI:   Sydney Mcintosh is a 19 y.o. female c/o  Works at assisted living -   Was sick last Monday - went in to [facility] ER 4/28 and discharged 4/30.    Pain medication for cramping, IV fluids   Still having some stomach cramping/diarrhea   No further vomiting  Was recommended labs at follow up  Needs BMP, magnesium  Wants to come tomorrow afternoon - 1:00 -2:00    Needs another note for work   They need to know she has the Norovirus  Aim for Thursday as a return to work     Reglan   Zofran  Ativan   Wants refills - [facility] -     PMH:   Patient Active Problem List    Diagnosis Date Noted     Chlamydia 09/17/2018     Contraception management - Depo 05/16/2018     Curvature Of Spine (Acquired) (Idiopathic)      Dysmenorrhea      Past Medical History:   Diagnosis Date     Chlamydia infection 05/2018     Past Surgical History:   Procedure Laterality Date     TONSILLECTOMY  2002     Social History     Socioeconomic History     Marital status: Single     Spouse name: Not on file     Number of children: Not on file     Years of education: Not on file     Highest education level: Not on file   Occupational History     Occupation: student - [employer]     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[employer]   Social Needs     Financial resource strain: Not on file     Food insecurity     Worry: Not on file     Inability: Not on file     Transportation needs     Medical: Not on file     Non-medical: Not on file   Tobacco Use     Smoking status: Never Smoker     Smokeless tobacco: Never Used   Substance and Sexual Activity     Alcohol use: No     Drug use: No     Sexual activity: Yes     Partners: Male     Birth control/protection: Injection   Lifestyle     Physical activity     Days per week: Not on file      Minutes per session: Not on file     Stress: Not on file   Relationships     Social connections     Talks on phone: Not on file     Gets together: Not on file     Attends Hinduism service: Not on file     Active member of club or organization: Not on file     Attends meetings of clubs or organizations: Not on file     Relationship status: Not on file     Intimate partner violence     Fear of current or ex partner: Not on file     Emotionally abused: Not on file     Physically abused: Not on file     Forced sexual activity: Not on file   Other Topics Concern     Not on file   Social History Narrative    Lives at home     Family History   Problem Relation Age of Onset     Endometriosis Mother      Thyroid disease Maternal Grandmother        Meds:    Current Outpatient Medications:      LORazepam (ATIVAN) 0.5 MG tablet, Take 0.5 mg by mouth., Disp: , Rfl:      metoclopramide (REGLAN) 10 MG tablet, Take 10 mg by mouth., Disp: , Rfl:      omeprazole (PRILOSEC) 20 MG capsule, as needed.    , Disp: , Rfl: 0     ammonium lactate (LAC-HYDRIN) 12 % lotion, Apply topically as needed for dry skin., Disp: , Rfl:      cetirizine (ZYRTEC) 10 MG tablet, Take 10 mg by mouth as needed.    , Disp: , Rfl:      dicyclomine (BENTYL) 10 MG capsule, as needed.    , Disp: , Rfl: 0     ondansetron (ZOFRAN ODT) 4 MG disintegrating tablet, Take 1 tablet (4 mg total) by mouth every 8 (eight) hours as needed for nausea., Disp: 8 tablet, Rfl: 0     triamcinolone acetonide 0.025 % Lotn, Apply topically., Disp: , Rfl:     Current Facility-Administered Medications:      medroxyPROGESTERone injection 150 mg (DEPO-PROVERA), 150 mg, Intramuscular, Q3 Months, Alicia Hall PA-C, 150 mg at 12/13/19 1137    Allergies:  Allergies   Allergen Reactions     Amoxicillin Rash       ROS:  Pertinent positives as noted in HPI; otherwise 12 point ROS negative.      Physical Exam:  EXAM:  There were no vitals taken for this visit.     This is a telephone  visit      Results:  Phone call duration:  11 minutes  [id number] - [id number]    Jennifer Olvera MD

## 2021-06-08 NOTE — TELEPHONE ENCOUNTER
I do not think Dr. Camarillo is back until the 15th in the clinic to fill out this form    I am not able to fill out the form unless the patient makes a virtual visit to discuss and go over all the questions and go over her illness and ongoing symptoms etc.    So she can wait till the 15th for her PCP or make a virtual visit.    I gave the form back to Mj

## 2021-06-08 NOTE — TELEPHONE ENCOUNTER
Travel questionnaire was asked. Verified that they have no signs of COVID-19 symptoms.    Patient dropped off Unemployment Insurance paperwork  for Dr. Camarillo to fill out. Placed the original copies in the 's slot.    When forms are completed, patient would like it:    Fax to [email or web address] [phone] -no copy is needed      Please re-route task back to the  to shred the copied forms and complete the task. Thanks!

## 2021-06-08 NOTE — PROGRESS NOTES
"Sydney Mcintosh is a 19 y.o. female who is being evaluated via a billable video visit.      The patient has been notified of following:     \"This video visit will be conducted via a call between you and your physician/provider. We have found that certain health care needs can be provided without the need for an in-person physical exam.  This service lets us provide the care you need with a video conversation.  If a prescription is necessary we can send it directly to your pharmacy.  If lab work is needed we can place an order for that and you can then stop by our lab to have the test done at a later time.    Video visits are billed at different rates depending on your insurance coverage. Please reach out to your insurance provider with any questions.    If during the course of the call the physician/provider feels a video visit is not appropriate, you will not be charged for this service.\"    Patient has given verbal consent to a Video visit? Yes       Patient would like the video invitation sent by: Text to cell phone: [phone]    Will anyone else be joining your video visit? No        Video Start Time: 2:06 PM    Additional provider notes:   Subjective: This patient had a virtual visit, video because of the coronavirus pandemic.    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    She has had history of C. difficile in the past.    She was hospitalized at [facility] on 4/28 through 4/30/2020 for norovirus.    She did have a follow-up on 5/4/2020 with magnesium 2.2 BMP was normal.    She has been tested for COVID-19 in the hospital that came back negative.    She continues to have symptoms, occasional cramping and diarrhea.  Sometimes she is constipated.    We discussed her diet and also discussed trying some MiraLAX to add some bulk to the diet    We talked about possible irritable bowel symptoms and they want to try dicyclomine.    She did need a refill on her Zofran and Reglan " she uses Zofran mainly for nausea Reglan more for cramping.  Also on omeprazole.  She should not be on any Zantac any further and I have DC'd that.    She does have some Carafate.    Patient has been off of work since 4/26/2020.    She is not ready to go back yet.  Please see below.  I did fill out some forms for work.  She will be off April 26 through June 22 and recheck with her primary physician at that point.        Tobacco status: She  reports that she has never smoked. She has never used smokeless tobacco.    Patient Active Problem List    Diagnosis Date Noted     Chlamydia 09/17/2018     Contraception management - Depo 05/16/2018     Curvature Of Spine (Acquired) (Idiopathic)      Dysmenorrhea        Current Outpatient Medications   Medication Sig Dispense Refill     ammonium lactate (LAC-HYDRIN) 12 % lotion Apply topically as needed for dry skin.       cetirizine (ZYRTEC) 10 MG tablet Take 10 mg by mouth as needed.              dicyclomine (BENTYL) 10 MG capsule as needed.         0     LORazepam (ATIVAN) 0.5 MG tablet Take 1 tablet (0.5 mg total) by mouth every 6 (six) hours as needed (nausea, abdominal cramping). 10 tablet 0     metoclopramide (REGLAN) 10 MG tablet 1 p.o. twice daily for abdominal cramps or nausea 20 tablet 0     omeprazole (PRILOSEC) 20 MG capsule as needed.         0     ondansetron (ZOFRAN ODT) 4 MG disintegrating tablet Take 1 tablet (4 mg total) by mouth every 12 (twelve) hours as needed for nausea. 20 tablet 0     triamcinolone acetonide 0.025 % Lotn Apply topically.       Current Facility-Administered Medications   Medication Dose Route Frequency Provider Last Rate Last Dose     medroxyPROGESTERone injection 150 mg (DEPO-PROVERA)  150 mg Intramuscular Q3 Months Alicia Hall PA-C   150 mg at 12/13/19 1137       ROS:   10 point review of systems positive as discussed above otherwise negative    Objective:    There were no vitals taken for this visit.  There is no height or  weight on file to calculate BMI.    General appearance no acute distress    HEENT no scleral icterus    Mucous membranes moist    Lungs nonlabored breathing she denies any wheezing    Heart denies any palpitations or tachycardia    Abdomen she does get some cramping discomfort at times otherwise nontender.    Extremities without edema    Skin without rash or jaundice change.    Labs from 5/5/2020 noted below with normal BMP normal magnesium    Results for orders placed or performed in visit on 05/05/20   Basic Metabolic Panel   Result Value Ref Range    Sodium 136 136 - 145 mmol/L    Potassium 4.1 3.5 - 5.0 mmol/L    Chloride 102 98 - 107 mmol/L    CO2 23 22 - 31 mmol/L    Anion Gap, Calculation 11 5 - 18 mmol/L    Glucose 79 70 - 125 mg/dL    Calcium 9.5 8.5 - 10.5 mg/dL    BUN 11 8 - 22 mg/dL    Creatinine 0.73 0.60 - 1.10 mg/dL    GFR MDRD Af Amer >60 >60 mL/min/1.73m2    GFR MDRD Non Af Amer >60 >60 mL/min/1.73m2   Magnesium   Result Value Ref Range    Magnesium 2.2 1.8 - 2.6 mg/dL       Assessment:  1. Gastroenteritis due to norovirus  ondansetron (ZOFRAN ODT) 4 MG disintegrating tablet    metoclopramide (REGLAN) 10 MG tablet   2. Gastritis without bleeding, unspecified chronicity, unspecified gastritis type       Gastroenteritis secondary to norovirus was hospitalized back in April, 28th through 30th.    Slowly improving    History of previous C. difficile this was negative in the hospital.    Ruled out COVID-19.    Continue to use as needed Zofran and Reglan, please see above regarding diet and trying some MiraLAX.    Gastritis, on omeprazole as needed Carafate.    May need additional evaluation, question irritable bowel syndrome, question need for colonoscopy or GI evaluation if symptoms persist    Plan: Patient will be off work April 26 through June 22 follow-up with primary care physician at that point    Please see form which was filled out regarding work and faxed for the patient    This transcription  uses voice recognition software, which may contain typographical errors.      Video-Visit Details    Type of service:  Video Visit     Video End Time (time video stopped): 2:24 PM  Originating Location (pt. Location): Home    Distant Location (provider location):  [facility]     Platform used for Video Visit: Aristeo Mcbride MD

## 2021-06-08 NOTE — TELEPHONE ENCOUNTER
Forms Request  Name of form/paperwork: Other:  Medical Statement for Unemployment   Have you been seen for this request: Yes:  05/04/20  Do we have the form: Drop Off today in 30 minutes   When is form needed by: As soon as possible   How would you like the form returned: Fax:  Per patient return fax is on form   Patient Notified form requests are processed in 3-5 business days: Yes    Okay to leave a detailed message? No

## 2021-06-09 NOTE — PROGRESS NOTES
"Clinic Care Coordination Contact    Reason: [id number] - Ambulatory referral to Care Management (Primary Care)      Intervention/Education provided during outreach: The clinic Community Health Worker talked with the patient today at the request of the PCP to discuss possible Clinic Care Coordination enrollment.  The service was described to the patient and immediate needs were discussed. The patient is informed CCC team includes RN, SW, CHW, and FRW work closely with PCP in the clinic.   Pt confirmed, \"I don't need anything at this time. Thank you for calling.   The patient declined enrollement at this time.  The PCP is encouraged to refer in the future if the patient's needs change.    Plan:   Message route to update PCP.   Clinic Care Coordination service: no further action needs at this time.     "

## 2021-06-09 NOTE — PROGRESS NOTES
Clinic Care Coordination Contact:  Reason(s):   -[id number] - Ambulatory referral to Care Management (Primary Care)   -Offer to rescheduling the initial assessment appt with [facility]     Intervention/Education provided during outreach: The clinic Community Health Worker talked with the patient today regards reason's above. Introduced self. Explain reason of called. Offered to reschedule the initial assessment appt with [facility] . The service was described to the patient and immediate needs were discussed. The patient stated info below. Pt declined rescheduling appt with [facility] SW. The patient declined enrollement at this time. The PCP is encouraged to refer in the future if the patient's needs change.    Patient stated:  -My unemployment application completed and faxed to the unemployment. The clinic faxed my paperwork to unemployment. I have spoke with the unemployment team. Confirmed my application have been received and that they are 4-5 weeks behind and I will be contact when gets to my application. Waiting to hear from them. Do not need help with this or any other thing at this time. Thank you.      Plan:   Message is route to update the pt's pcp and [facility] SW.   CHW/[facility] service: no further action need.

## 2021-06-09 NOTE — PROGRESS NOTES
6/22/2020  [facility] Referral:  Recent ER visit  Clinic Care Coordination Contact  Community Health Worker Initial Outreach         Patient accepts CC: Yes     The Clinic Community Health Worker spoke with the patient today to discuss possible Clinic Care Coordination enrollment.  The service was described to the patient and immediate needs were discussed.  The patient agreed to enrollment and an assessment was scheduled.  The patient was provided with contact information for the clinic CHW.             Assessment date: 7-1-20 at 1pm with CCC SW      CHW Follow up 7-1-20 at 1pm consult with CCC SW

## 2021-06-09 NOTE — TELEPHONE ENCOUNTER
6/22/2020  Patient would like to know if  Dr Mcbride completed the paperwork/form and fax to [employer].   Because she has no receive anything back from [employer].  Please give her a call to notify her.

## 2021-06-09 NOTE — PROGRESS NOTES
Clinic Care Coordination Contact  Care Team Conversations     SW attempted to reach patient for assessment. No answer, left voicemail.
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
## 2021-06-11 NOTE — PROGRESS NOTES
Clinic Care Coordination Contact  Care Team Conversations     SW attempted to reach Sydney to complete an assessment for CCC. She did not answer, left voicemail.

## 2021-06-11 NOTE — PROGRESS NOTES
"Sydney Mcintosh is a 20 y.o. female who is being evaluated via a billable video visit.      The patient has been notified of following:     \"This video visit will be conducted via a call between you and your physician/provider. We have found that certain health care needs can be provided without the need for an in-person physical exam.  This service lets us provide the care you need with a video conversation.  If a prescription is necessary we can send it directly to your pharmacy.  If lab work is needed we can place an order for that and you can then stop by our lab to have the test done at a later time.    Video visits are billed at different rates depending on your insurance coverage. Please reach out to your insurance provider with any questions.    If during the course of the call the physician/provider feels a video visit is not appropriate, you will not be charged for this service.\"    Patient has given verbal consent to a Video visit? Yes  How would you like to obtain your AVS? AVS Preference: Mail a copy.  If dropped by the video visit, the video invitation should be sent to: Text to cell phone: [phone]   Will anyone else be joining your video visit? No        Video Start Time: 3:13 pm    Additional provider notes:     Subjective: This patient had a virtual visit, video, due to the coronavirus pandemic.    Patient has had symptoms of some numbness in the fingertips for about a week on and off.  She describes it and both hands worse on the left than the right mainly the middle 3 fingers.    She denies any tenderness at the elbow or through the axilla area    Is had no neck symptoms.  Denies any pain through the neck    Denies any weakness.    She states it was on and off morning woke up with it but more recently it is been a little more persistent.    She denies any injury.  Denies any rash.    Denies any tingling or numbness in her feet or any other place.    Patient is also had some white spots along " the edge of her tongue and they do not really come off its along the back edge not on the top or in the oral mucosa.  It stings when she eats certain foods and spicier foods and salty foods.    Denies any sore throat for cough denies any shortness of breath denies any loss of sense of taste or smell.      Tobacco status: She  reports that she has never smoked. She has never used smokeless tobacco.    Patient Active Problem List    Diagnosis Date Noted     Chlamydia 09/17/2018     Contraception management - Depo 05/16/2018     Curvature Of Spine (Acquired) (Idiopathic)      Dysmenorrhea        Current Outpatient Medications   Medication Sig Dispense Refill     ammonium lactate (LAC-HYDRIN) 12 % lotion Apply topically as needed for dry skin.       cetirizine (ZYRTEC) 10 MG tablet Take 10 mg by mouth as needed.              dicyclomine (BENTYL) 10 MG capsule as needed.         0     LORazepam (ATIVAN) 0.5 MG tablet Take 1 tablet (0.5 mg total) by mouth every 6 (six) hours as needed (nausea, abdominal cramping). 10 tablet 0     metoclopramide (REGLAN) 10 MG tablet 1 p.o. twice daily for abdominal cramps or nausea 20 tablet 0     omeprazole (PRILOSEC) 20 MG capsule as needed.         0     ondansetron (ZOFRAN ODT) 4 MG disintegrating tablet Take 1 tablet (4 mg total) by mouth every 12 (twelve) hours as needed for nausea. 20 tablet 0     triamcinolone acetonide 0.025 % Lotn Apply topically.       alum/mag hydrox-simethicone-diphenhydramine-lidocaine (MAGIC MOUTHWASH) suspension Swish and spit 10 mL 3-4 times daily as needed for tongue pain 240 mL 0     Current Facility-Administered Medications   Medication Dose Route Frequency Provider Last Rate Last Dose     medroxyPROGESTERone injection 150 mg (DEPO-PROVERA)  150 mg Intramuscular Q3 Months Alicia Hall PA-C   150 mg at 08/18/20 1059       ROS:   10 point review of systems positive as discussed above otherwise negative    Objective:    There were no vitals  taken for this visit.  There is no height or weight on file to calculate BMI.      General: No acute distress    HEENT: Oropharynx denies any redness posteriorly though white spots have faded some there along the edge of the tongue bilaterally.    No oral mucosal lesions    Neck without adenopathy neck is supple    Lungs: Nonlabored breathing no wheezing.    Heart: No rapid heart rate or palpitations    Abdomen nontender    Extremities without edema in the lower extremities.    Denies any rash.    Upper extremities with some tingling she states in the second third and fourth fingers more on the left hand than the right.  Phalen's was equivocal Tinel negative    She does not have any tenderness along the ulnar grooves.    Neck was nontender    Results for orders placed or performed in visit on 08/18/20   Pregnancy, Urine   Result Value Ref Range    Pregnancy Test, Urine Negative Negative       Assessment:  1. Glossitis  alum/mag hydrox-simethicone-diphenhydramine-lidocaine (MAGIC MOUTHWASH) suspension   2. Numbness of fingers of both hands       Use Magic mouthwash for the glossitis swish and spit 10 mL 3 times daily as needed.    Patient will try wrist splint at night and see if that helps with the tingling it sounds most like carpal tunnel.    Can use ibuprofen over-the-counter as needed    Consider EMG    If other areas develop numbness consider additional testing imaging lab work etc.     plan: As outlined above    This transcription uses voice recognition software, which may contain typographical errors.finger numbness      Video-Visit Details    Type of service:  Video Visit    Video End Time (time video stopped): 3:28 PM  Originating Location (pt. Location): Home    Distant Location (provider location):  [facility]/OB     Platform used for Video Visit: Aristeo Mcbride MD

## 2021-06-11 NOTE — PROGRESS NOTES
Clinic Care Coordination Contact  [facility]/Voicemail       Clinical Data: Care Coordinator Outreach  Outreach attempted x 2.  Left message on patient's voicemail with call back information and requested return call.  Plan: Care Coordinator will try to reach patient again in 3-5 business days.    Next Outreach: 9/30/20

## 2021-06-11 NOTE — PROGRESS NOTES
Clinic Care Coordination Contact  [facility]       Clinical Data: Care Coordinator Outreach  Outreach attempted x 1.  Left message on patient's voicemail with call back information and requested return call.  Plan: Care Coordinator will try to reach patient again in 1-2 business days.    Next Outreach: 9/22/20

## 2021-06-11 NOTE — PROGRESS NOTES
Clinic Care Coordination Contact  Community Health Worker Initial Outreach    CHW Initial Information Gathering:  Referral Source: ED Follow-Up  Preferred Hospital: [facility]  [phone]  Preferred Urgent Care: [facility], [phone]  Current living arrangement:: I live in a private home with family  Type of residence:: Private home - stairs  Community Resources: None  Supplies used at home:: None  Equipment Currently Used at Home: none  Informal Support system:: Family, Friends, Partner  No PCP office visit in Past Year: No  Transportation means:: Regular car       Patient accepts CC: Yes. Patient scheduled for assessment with the  on 9/15/20 at 3:00 pm. Patient noted desire to discuss financial supports from the [facility] at this time..     Next Outreach: 10/15/20

## 2021-06-11 NOTE — PROGRESS NOTES
Clinic Care Coordination Contact  [facility]/Voicemail       Clinical Data: Care Coordinator Outreach  Outreach attempted x 3.  Left message on patient's voicemail with call back information and requested return call.  Plan: Care Coordinator will send unable to contact letter with care coordinator contact information via [employer]. Care Coordinator will do no further outreaches at this time.

## 2021-06-12 NOTE — PROGRESS NOTES
ASSESSMENT/PLAN:  1. Vaginal discharge  Wet Prep, Vaginal    Chlamydia trachomatis & Neisseria gonorrhoeae, Amplified Detection   2. Bacterial vaginosis  metroNIDAZOLE (FLAGYL) 500 MG tablet       This is an 17 year old female - seen today for evaluation of vaginal discharge.  Exam shows wet prep + for clue cells.  Will treat with Metronidazole as ordered.  If persisting symptoms, NTBS.         There are no discontinued medications.  There are no Patient Instructions on file for this visit.    Chief Complaint:  Chief Complaint   Patient presents with     Vaginal Discharge       HPI:   Sydney Mcintosh is a 17 y.o. female c/o  Vaginal discharge   New partner in last few months  Uses Depo - has occ bleeding only if late on Depo  No bleeding x at least a month  Occasional itching  Hasn't used anything for symptoms currently  No recent antibiotics      PMH:   Patient Active Problem List    Diagnosis Date Noted     Dermatitis      Acute Pharyngitis      Curvature Of Spine (Acquired) (Idiopathic)      Pain During Urination (Dysuria)      Dysmenorrhea      Abdominal Pain      No past medical history on file.  Past Surgical History:   Procedure Laterality Date     TONSILLECTOMY  2002     Social History     Social History     Marital status: Single     Spouse name: N/A     Number of children: N/A     Years of education: N/A     Occupational History     student - [employer] ([employer] [place] [employer]      Social History Main Topics     Smoking status: Never Smoker     Smokeless tobacco: Never Used     Alcohol use No     Drug use: No     Sexual activity: Yes     Partners: Male     Birth control/ protection: Injection     Other Topics Concern     Not on file     Social History Narrative    Lives at home       Meds:    Current Outpatient Prescriptions:      ammonium lactate (LAC-HYDRIN) 12 % lotion, Apply topically as needed for dry skin., Disp: , Rfl:      cetirizine (ZYRTEC) 10 MG tablet, Take 10 mg by mouth  "daily., Disp: , Rfl:      ondansetron (ZOFRAN ODT) 4 MG disintegrating tablet, Take 1 tablet (4 mg total) by mouth every 8 (eight) hours as needed for nausea., Disp: 8 tablet, Rfl: 0     triamcinolone acetonide 0.025 % Lotn, Apply topically., Disp: , Rfl:      cyclobenzaprine (FLEXERIL) 10 MG tablet, Take 0.5 tablets (5 mg total) by mouth every 8 (eight) hours as needed for muscle spasms., Disp: 8 tablet, Rfl: 0     FA/MV,CA,IRON,MIN/LYCOPENE/LUT (MULTIVITAL ORAL), Take 1 tablet by mouth daily., Disp: , Rfl:     Current Facility-Administered Medications:      medroxyPROGESTERone injection 150 mg (DEPO-PROVERA), 150 mg, Intramuscular, Q3 Months, Alicia Hall PA-C, 150 mg at 07/21/17 1207    Allergies:  Allergies   Allergen Reactions     Amoxicillin Rash       ROS:  Pertinent positives as noted in HPI; otherwise 12 point ROS negative.      Physical Exam:  EXAM:  /60  Pulse 80  Temp 98.4  F (36.9  C) (Oral)   Resp 14  Ht 5' 1\" (1.549 m)  Wt 111 lb 9.6 oz (50.6 kg)  LMP 07/20/2017  BMI 21.09 kg/m2   Gen:  NAD, appears well, well-hydrated  HEENT:  TMs nl, oropharynx benign, nasal mucosa nl, conjunctiva clear  Neck:  Supple, no adenopathy, no thyromegaly, no carotid bruits, no JVD  Lungs:  Clear to auscultation bilaterally  Cor:  RRR no murmur  Abd:  Soft, nontender, BS+, no masses, no guarding or rebound, no HSM  PELVIC EXAM:External genitalia: normal  Vaginal mucosa normal  Vaginal discharge: yellow discharge  Speculum exam shows a normal appearing cervix .   Bimanual exam: Cervix closed, firm, non tender  to motion.  Uterus  firm, regular, mobile, non tender to palpation. No adnexal masses or tenderness.   Extr:  Neg.  Neuro:  No asymmetry  Skin:  Warm/dry        Results:  Results for orders placed or performed in visit on 07/28/17   Wet Prep, Vaginal   Result Value Ref Range    Yeast Result No yeast seen No yeast seen    Trichomonas No Trichomonas seen No Trichomonas seen    Clue Cells, Wet Prep " Clue cells seen (!) No Clue cells seen   Chlamydia trachomatis & Neisseria gonorrhoeae, Amplified Detection   Result Value Ref Range    Chlamydia trachomatis, Amplified Detection Negative Negative    Neisseria gonorrhoeae, Amplified Detection Negative Negative

## 2021-06-13 NOTE — PROGRESS NOTES
HEARING SCREENING RESULTS      Left Ear    20db HL p p p p   25db HL       40db HL                 500Hz       1000Hz    2000Hz      4000Hz    Right Ear    20db HL p p p p   25db HL       40db HL                 500Hz       1000Hz    2000Hz      4000Hz    Vision:    Right:  10/12.5  Left:     10/8  Both:  10/8

## 2021-06-13 NOTE — PROGRESS NOTES
[facility] Well Child Check    ASSESSMENT & PLAN  Sydney Mcintosh is a 17 y.o. who has normal growth and normal development.    Diagnoses and all orders for this visit:    Routine infant or child health check  -     Hepatitis A vaccine Ped/Adol 2 dose IM (18yr & under)  -     Vision Screening  -     Hearing Screening    Other orders  -     Meningococcal MCV4P      Return to clinic in 1 year for a Well Child Check or sooner as needed    IMMUNIZATIONS/LABS  Immunizations were reviewed and orders were placed as appropriate. and I have discussed the risks and benefits of all of the vaccine components with the patient/parents.  All questions have been answered.    REFERRALS  Dental:  Recommend routine dental care as appropriate., The patient has already established care with a dentist.  Other:  No additional referrals were made at this time. and No referrals were made at this time.    ANTICIPATORY GUIDANCE  I have reviewed age appropriate anticipatory guidance.  Social:  Friends and Extracurricular Activities  Parenting:  Support, Chores and Family Time  Nutrition:  Junk Food  Play and Communication:  Organized Sports, Appropriate Use of TV, Hobbies, Creative Talents and Read Books  Health:  Drugs, Smoking, Alcohol, Activity (>45 min/day) and Sleep  Safety:  Seat Belts  Sexuality:  Body Changes    HEALTH HISTORY  Do you have any concerns that you'd like to discuss today?: No concerns       Roomed by: Naomi    Accompanied by Other sister   Refills needed? No    Do you have any forms that need to be filled out? No        Do you have any significant health concerns in your family history?: No  Family History   Problem Relation Age of Onset     Endometriosis Mother      Since your last visit, have there been any major changes in your family, such as a move, job change, separation, divorce, or death in the family?: No    Home  Who lives in your home?:  Mom, brother, younger sister (Silvia)  Social History     Social History  "Narrative    Lives at home     Do you have any trouble with sleep?:  No    Education  What school does your child attend?:  [employer] - trying to finish her senior year  What grade is your child in?:  12th  How does the patient perform in school (grades, behavior, attention, homework?: doing okay at school     Eating  Does patient eat regular meals including fruits and vegetables?:  yes  What is the patient drinking (cow's milk, water, soda, juice, sports drinks, energy drinks, etc)?: water and juice  Does patient have concerns about body or appearance?:  No    Activities  Does the patient have friends?:  yes  Does the patient get at least one hour of physical activity per day?:  Tries to stay active  Does the patient have less than 2 hours of screen time per day (aside from homework)?:  Less than an hour  What does your child do for exercise?:  gym  Does the patient have interest/participate in community activities/volunteers/school sports?:  Works at [employer]    MENTAL HEALTH SCREENING  PHQ-2 Total Score: 1 (7/28/2017 10:00 AM)  No Data Recorded    VISION/HEARING  Vision: completed  Hearing:  Completed. See Results    No exam data present    TB Risk Assessment:  The patient and/or parent/guardian answer positive to:  none    Dental  Is your child being seen by a dentist?  Yes  [employer] Varnish Application Screening  Is child seen by dentist?     Yes    Patient Active Problem List   Diagnosis     Acute Pharyngitis     Curvature Of Spine (Acquired) (Idiopathic)     Pain During Urination (Dysuria)     Dysmenorrhea     Abdominal Pain     Dermatitis       Drugs  Does the patient use tobacco/alcohol/drugs?:  no    Safety  Does the patient have any safety concerns (peer or home)?:  no  Does the patient use safety belts, helmets and other safety equipment?:  yes    Sex  Is the patient sexually active?:  no    MEASUREMENTS  Height:  5' 1.5\" (1.562 m)  Weight: 107 lb 11.2 oz (48.9 " "kg)  BMI: Body mass index is 20.02 kg/(m^2).  Blood Pressure: 98/60  Blood pressure percentiles are 14 % systolic and 34 % diastolic based on NHBPEP's 4th Report. Blood pressure percentile targets: 90: 123/79, 95: 126/83, 99 + 5 mmH/95.    PHYSICAL EXAM  Physical Exam  EXAM:  BP 98/60 (Patient Site: Right Arm, Patient Position: Sitting, Cuff Size: Adult Small)  Pulse 88  Ht 5' 1.5\" (1.562 m)  Wt 107 lb 11.2 oz (48.9 kg)  BMI 20.02 kg/m2   Gen:  NAD, appears well, well-hydrated  HEENT:  TMs nl, oropharynx benign, nasal mucosa nl, conjunctiva clear  Neck:  Supple, no adenopathy, no thyromegaly,   Lungs:  Clear to auscultation bilaterally  Cor:  RRR no murmur  Abd:  Soft, nontender, BS+, no masses, no guarding or rebound, no HSM  Extr:  Neg.  Neuro:  No asymmetry, Nl motor tone/strength, nl sensation, reflexes =, gait nl, nl coordination, CN intact,   Skin:  Warm/dry      "

## 2021-06-14 NOTE — PROGRESS NOTES
ASSESSMENT/PLAN:  1. Encounter for surveillance of injectable contraceptive  Pregnancy (Beta-hCG, Qual), Urine    medroxyPROGESTERone injection 150 mg (DEPO-PROVERA)   2. Vaginal discharge  Chlamydia trachomatis & Neisseria gonorrhoeae, Amplified Detection    Wet Prep, Vaginal   3. Nausea  ondansetron (ZOFRAN ODT) 4 MG disintegrating tablet   4. Vasovagal syncope     5. Screening for cervical cancer  Gynecologic Cytology (PAP Smear)    HPV High Risk DNA Cervical       This is a 19 yo female with:  1.  Contraception - pregnancy test is negative; risk of pregnancy relatively low by history; Will give her Depo Pr overa today  2.  Vaginal discharge - patient with assumed monogamous relationship - will check wet prep, GC/Chlam  3.  Nausea - uses occasional Ondansetron  4.  Vasovagal syncope-had syncopal event in shower - seen in ER   I have reviewed available ER records,  notes, as well as imaging and lab results.  In addition I have reviewed the medication list and made any adjustments as indicated in orders.  5.  Due for cervical cancer screening - Pap/HPV done/sent.   No follow-ups on file.  Administrations This Visit     medroxyPROGESTERone injection 150 mg (DEPO-PROVERA)     Admin Date  12/22/2020 Action  Given Dose  150 mg Route  Intramuscular Administered By  Valery Newby MA                Medications Discontinued During This Encounter   Medication Reason     ondansetron (ZOFRAN ODT) 4 MG disintegrating tablet Reorder     There are no Patient Instructions on file for this visit.    Chief Complaint:  Chief Complaint   Patient presents with     Follow-up     from fall     depo shot     STD test       HPI:   Sydney Mcintosh is a 20 y.o. 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  Had a couple \"floating\" meds in her pockets - took them out of her pockets and \"that was that\"    Stayed home day it happened; tried to work the next day - bad nausea - left early from work; was leaving early daily - after a few days passed, went to be seen  Not pregnant - ?  Did home pregnancy test 2 days ago - negative; had pregnancy test today - negative  Last intercourse a few days ago - used condoms -     Currently less and less nauseous  Works at [employer] - CNA    Not using any meds except Ibuprofen -     Lives above her mom -     PMH:   Patient Active Problem List    Diagnosis Date Noted     Chlamydia 09/17/2018     Contraception management - Depo 05/16/2018     Curvature Of Spine (Acquired) (Idiopathic)      Dysmenorrhea      Past Medical History:   Diagnosis Date     Chlamydia infection 05/2018     Past Surgical History:   Procedure Laterality Date     TONSILLECTOMY  2002     Social History     Socioeconomic History     Marital status: Single     Spouse name: Not on file     Number of children: Not on file     Years of education: Not on file     Highest education level: Not on file   Occupational History     Occupation: student - [employer]     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[employer]; [employer]   Social Needs     Financial resource strain: Not on file     Food insecurity     Worry: Not on file     Inability: Not on file     Transportation needs     Medical: Not on file     Non-medical: Not on file   Tobacco Use     Smoking status: Never Smoker     Smokeless tobacco: Never Used   Substance and Sexual Activity     Alcohol use: No     Drug use: No     Sexual activity: Yes     Partners: Male     Birth control/protection: Injection   Lifestyle     Physical activity     Days per week: Not on file     Minutes per session: Not on file     Stress: Not on file   Relationships     Social connections     Talks on phone: Not on file     Gets " together: Not on file     Attends Anglican service: Not on file     Active member of club or organization: Not on file     Attends meetings of clubs or organizations: Not on file     Relationship status: Not on file     Intimate partner violence     Fear of current or ex partner: Not on file     Emotionally abused: Not on file     Physically abused: Not on file     Forced sexual activity: Not on file   Other Topics Concern     Not on file   Social History Narrative    Lives at home     Family History   Problem Relation Age of Onset     Endometriosis Mother      Thyroid disease Maternal Grandmother        Meds:    Current Outpatient Medications:      alum/mag hydrox-simethicone-diphenhydramine-lidocaine (MAGIC MOUTHWASH) suspension, Swish and spit 10 mL 3-4 times daily as needed for tongue pain, Disp: 240 mL, Rfl: 0     ammonium lactate (LAC-HYDRIN) 12 % lotion, Apply topically as needed for dry skin., Disp: , Rfl:      cetirizine (ZYRTEC) 10 MG tablet, Take 10 mg by mouth as needed.    , Disp: , Rfl:      dicyclomine (BENTYL) 10 MG capsule, as needed.    , Disp: , Rfl: 0     LORazepam (ATIVAN) 0.5 MG tablet, Take 1 tablet (0.5 mg total) by mouth every 6 (six) hours as needed (nausea, abdominal cramping)., Disp: 10 tablet, Rfl: 0     metoclopramide (REGLAN) 10 MG tablet, 1 p.o. twice daily for abdominal cramps or nausea, Disp: 20 tablet, Rfl: 0     omeprazole (PRILOSEC) 20 MG capsule, as needed.    , Disp: , Rfl: 0     triamcinolone acetonide 0.025 % Lotn, Apply topically., Disp: , Rfl:      ondansetron (ZOFRAN ODT) 4 MG disintegrating tablet, Take 1 tablet (4 mg total) by mouth every 12 (twelve) hours as needed for nausea., Disp: 20 tablet, Rfl: 0    Current Facility-Administered Medications:      medroxyPROGESTERone injection 150 mg (DEPO-PROVERA), 150 mg, Intramuscular, Q3 Months, Alicia Hall PA-C, 150 mg at 08/18/20 1059     medroxyPROGESTERone injection 150 mg (DEPO-PROVERA), 150 mg, Intramuscular,  "Q3 Months, Jennifer Olvera MD, 150 mg at 12/22/20 1645    Allergies:  Allergies   Allergen Reactions     Amoxicillin Rash       ROS:  Pertinent positives as noted in HPI; otherwise 12 point ROS negative.      Physical Exam:  EXAM:  /58 (Patient Site: Right Arm, Patient Position: Sitting, Cuff Size: Adult Regular)   Pulse 84   Temp 99.1  F (37.3  C) (Tympanic)   Resp 16   Ht 5' 1.25\" (1.556 m)   Wt 145 lb (65.8 kg)   BMI 27.17 kg/m     Gen:  NAD, appears well, well-hydrated  HEENT:  TMs nl, oropharynx benign, nasal mucosa nl, conjunctiva clear  Neck:  Supple, no adenopathy, no thyromegaly, no carotid bruits, no JVD  Lungs:  Clear to auscultation bilaterally  Cor:  RRR no murmur  Abd:  Soft, nontender, BS+, no masses, no guarding or rebound, no HSM  PELVIC EXAM:External genitalia: normal  Vaginal mucosa normal  Vaginal discharge:yellow  Speculum exam shows a normal appearing cervix .   Bimanual exam: Cervix closed, firm, non tender  to motion.  Uterus  firm, regular, mobile, non tender to palpation. No adnexal masses or tenderness.   Extr:  Neg.  Neuro:  No asymmetry  Skin:  Warm/dry        Results:  Results for orders placed or performed in visit on 12/22/20   Chlamydia trachomatis & Neisseria gonorrhoeae, Amplified Detection    Specimen: Cervix, Endocervical; Body Fluid   Result Value Ref Range    Chlamydia trachomatis, Amplified Detection Negative Negative    Neisseria gonorrhoeae, Amplified Detection Negative Negative   Wet Prep, Vaginal    Specimen: Genital   Result Value Ref Range    Yeast Result Yeast Seen (!) No yeast seen    Trichomonas No Trichomonas seen No Trichomonas seen    Clue Cells, Wet Prep No Clue cells seen No Clue cells seen   Pregnancy (Beta-hCG, Qual), Urine   Result Value Ref Range    Pregnancy Test, Urine Negative Negative             "

## 2021-06-15 NOTE — PROGRESS NOTES
Subjective:      Patient ID: Sydney Mcintosh is a 17 y.o. female.    Chief Complaint:    HPI Sydney Mcintosh is a 17 y.o. female who presents today complaining of fever, cough, and nasal congestion x 2-3 days. Patient does have a known exposure to the flu. Tmax 100.4 last night. She has not had any antipyretics today. Patient denies any hx of asthma, but she does have albuterol mainly for when she has a cold. She also took Nyquil and Mucinex. She has also had some nausea, but mainly when she is coughing.         Past Surgical History:   Procedure Laterality Date     TONSILLECTOMY  2002       Family History   Problem Relation Age of Onset     Endometriosis Mother        Social History   Substance Use Topics     Smoking status: Never Smoker     Smokeless tobacco: Never Used     Alcohol use No       Review of Systems   Constitutional: Positive for appetite change and fever (Tmax 100.4).   HENT: Positive for congestion, rhinorrhea and sore throat. Negative for ear pain.    Respiratory: Positive for cough (productive, yellow) and chest tightness.    Gastrointestinal: Positive for abdominal pain (while coughing) and nausea. Negative for diarrhea and vomiting.   Skin: Negative for rash.       Objective:     /62  Pulse 106  Temp 99.7  F (37.6  C) (Oral)   Wt 113 lb 11.2 oz (51.6 kg)  SpO2 100%  BMI 21.14 kg/m2    Physical Exam   Constitutional: She appears well-developed and well-nourished. No distress.   HENT:   Head: Normocephalic and atraumatic.   Right Ear: External ear normal.   Left Ear: External ear normal.   Mouth/Throat: No oropharyngeal exudate, posterior oropharyngeal edema, posterior oropharyngeal erythema or tonsillar abscesses.   Taste buds mildly enlarged. No other abnormalities in the pharynx.    Eyes: Conjunctivae are normal.   Neck: Normal range of motion. Neck supple.   Cardiovascular: Normal rate, regular rhythm and normal heart sounds.  Exam reveals no gallop and no friction rub.    No  murmur heard.  Pulmonary/Chest: Effort normal and breath sounds normal. No respiratory distress. She has no wheezes. She has no rales.   Lymphadenopathy:     She has cervical adenopathy.   Skin: She is not diaphoretic.   Psychiatric: She has a normal mood and affect. Her behavior is normal. Judgment and thought content normal.   Nursing note and vitals reviewed.    Labs:  Recent Results (from the past 24 hour(s))   Influenza A/B Rapid Test   Result Value Ref Range    Influenza  A, Rapid Antigen No Influenza A antigen detected No Influenza A antigen detected    Influenza B, Rapid Antigen Influenza B antigen detected (!) No Influenza B antigen detected   Rapid Strep A Screen-Throat   Result Value Ref Range    Rapid Strep A Antigen No Group A Strep detected, presumptive negative No Group A Strep detected, presumptive negative       Assessment:     Procedures    1. Fever  Influenza A/B Rapid Test    Rapid Strep A Screen-Throat    Group A Strep, RNA Direct Detection, Throat   2. Influenza B  oseltamivir (TAMIFLU) 75 MG capsule         Patient Instructions   1. Take Tylenol as needed for fever relief every 6 hours. If this alone does not control your fever you can add ibuprofen 3 hours after each dose of your Tylenol.  2. Take Tamiflu, follow directions on prescription.  3. Increase fluids and rest.  4. Follow up if you develop fever that can not be controlled, difficulty breathing, or severe dehydration.  5.  Your rapid strep was negative today.  We will send out for confirmatory strep test the takes between 24 and 48 hours.  We only contact you if your confirmatory test is positive and requires an antibiotic.

## 2021-06-16 PROBLEM — A74.9 CHLAMYDIA: Status: ACTIVE | Noted: 2018-09-17

## 2021-06-16 PROBLEM — Z30.9 CONTRACEPTION MANAGEMENT: Status: ACTIVE | Noted: 2018-05-16

## 2021-06-18 NOTE — PROGRESS NOTES
Subjective:      Sydney Mcintosh is a 17 y.o. female who presents for evaluation of STD check.  I saw her about 1 month ago for STD check.  She tested positive for chlamydia earlier in the spring.  Last month all STD tests were negative.  She is here today to recheck.  She is on Depo-Provera for birth control.    Patient Active Problem List   Diagnosis     Curvature Of Spine (Acquired) (Idiopathic)     Dysmenorrhea     Contraception management - Depo        Current Outpatient Prescriptions:      ammonium lactate (LAC-HYDRIN) 12 % lotion, Apply topically as needed for dry skin., Disp: , Rfl:      cetirizine (ZYRTEC) 10 MG tablet, Take 10 mg by mouth daily., Disp: , Rfl:      ondansetron (ZOFRAN ODT) 4 MG disintegrating tablet, Take 1 tablet (4 mg total) by mouth every 8 (eight) hours as needed for nausea., Disp: 8 tablet, Rfl: 0     triamcinolone acetonide 0.025 % Lotn, Apply topically., Disp: , Rfl:     Current Facility-Administered Medications:      medroxyPROGESTERone injection 150 mg (DEPO-PROVERA), 150 mg, Intramuscular, Q3 Months, Alicia Hall PA-C, 150 mg at 04/03/18 1112    Objective:     Allergies:  Amoxicillin    Vitals:    06/15/18 1352   BP: 108/60   Pulse: 76   Resp: 20     Body mass index is 21.56 kg/(m^2).    General: Alert and oriented x 3, in no apparent distress  Genitourinary: External genitalia is normal in appearance, vaginal walls are healthy, cervix is well visualized and normal in appearance, no significant discharge noted    Results for orders placed or performed in visit on 06/15/18   Wet Prep, Vaginal   Result Value Ref Range    Yeast Result No yeast seen No yeast seen    Trichomonas No Trichomonas seen No Trichomonas seen    Clue Cells, Wet Prep No Clue cells seen No Clue cells seen   Other labs pending.    Assessment and Plan:     1.  STD check.  Patient will be informed of results when available.  She declined HIV and syphilis testing.  No other concerns.  Follow-up as  needed.    This dictation uses voice recognition software, which may contain typographical errors.

## 2021-06-18 NOTE — PROGRESS NOTES
Subjective:      Sydney Mcintosh is a 17 y.o. female who presents for evaluation of follow-up ER for chlamydia infection.  Records requested and reviewed through care everywhere today.  She presented to the ER on 5/4/2018 with abdominal pain.  She was ultimately found to have chlamydia and a yeast infection.  Initially they also thought she had a UTI.  She was treated with Keflex, fluconazole, and azithromycin.  She stopped the Keflex after a few days, when it was found that she likely did not have a UTI.  I reviewed all blood work completed at the ER.  CT scan of abdomen and pelvis was also done and grossly normal.  She is on Depo-Provera.  Pregnancy test was negative.  Today she verifies she did take fluconazole and azithromycin as directed.  She is only had one other partner.  He was notified.  She has not had intercourse with him since her diagnosis.  She is feeling fine.  No concerns.  She still has a little bit of vaginal discharge, but thinks this could be normal.    Patient Active Problem List   Diagnosis     Curvature Of Spine (Acquired) (Idiopathic)     Dysmenorrhea        Current Outpatient Prescriptions:      ammonium lactate (LAC-HYDRIN) 12 % lotion, Apply topically as needed for dry skin., Disp: , Rfl:      cetirizine (ZYRTEC) 10 MG tablet, Take 10 mg by mouth daily., Disp: , Rfl:      ondansetron (ZOFRAN ODT) 4 MG disintegrating tablet, Take 1 tablet (4 mg total) by mouth every 8 (eight) hours as needed for nausea., Disp: 8 tablet, Rfl: 0     triamcinolone acetonide 0.025 % Lotn, Apply topically., Disp: , Rfl:      cyclobenzaprine (FLEXERIL) 10 MG tablet, Take 0.5 tablets (5 mg total) by mouth every 8 (eight) hours as needed for muscle spasms., Disp: 8 tablet, Rfl: 0     FA/MV,CA,IRON,MIN/LYCOPENE/LUT (MULTIVITAL ORAL), Take 1 tablet by mouth daily., Disp: , Rfl:     Current Facility-Administered Medications:      medroxyPROGESTERone injection 150 mg (DEPO-PROVERA), 150 mg, Intramuscular, Q3  Months, Alicia Hall PA-C, 150 mg at 04/03/18 1112    Objective:     Allergies:  Amoxicillin    Vitals:    05/16/18 1532   BP: 100/60   Pulse: 95   Resp: 16   SpO2: 97%     Body mass index is 21.38 kg/(m^2).    General: Alert and oriented x 3, in no apparent distress  Genitourinary: External genitalia is normal in appearance, vaginal walls are healthy, cervix is well visualized and normal in appearance, no significant discharge noted    Labs pending.    Assessment and Plan:     1.  Follow-up ER Chlamydia infection.  Labs pending.  I will follow-up with results.  I reviewed with patient that it is possible she may have a false positive, given that she was treated only about 2 weeks ago.  She understands this, but would like to be tested again anyway.    2.  Follow-up ER vaginal yeast infection.  She took fluconazole.  Wet prep was done today.  I will follow-up with results, and treat as appropriate.    This dictation uses voice recognition software, which may contain typographical errors.

## 2021-06-19 NOTE — PROGRESS NOTES
ASSESSMENT/PLAN:  1. 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  She needs pulmonary TB screening.  We will do quantiferon testing.  Discussed with patient/ parent.         There are no discontinued medications.  There are no Patient Instructions on file for this visit.    Chief Complaint:  Chief Complaint   Patient presents with     TB Gold     Need for CNA class       HPI:   Sydney Mcintosh is a 18 y.o. female c/o  Needs TB testing for CNA class  No fever or chills, no night sweats  No h/o exposure to TB  No chronic cough    PMH:   Patient Active Problem List    Diagnosis Date Noted     Contraception management - Depo 05/16/2018     Curvature Of Spine (Acquired) (Idiopathic)      Dysmenorrhea      Past Medical History:   Diagnosis Date     Chlamydia infection 05/2018     Past Surgical History:   Procedure Laterality Date     TONSILLECTOMY  2002     Social History     Social History     Marital status: Single     Spouse name: N/A     Number of children: N/A     Years of education: N/A     Occupational History     student - [email or web address] ("[email or web address] [place] [employer]      Social History Main Topics     Smoking status: Never Smoker     Smokeless tobacco: Never Used     Alcohol use No     Drug use: No     Sexual activity: Yes     Partners: Male     Birth control/ protection: Injection     Other Topics Concern     Not on file     Social History Narrative    Lives at home       Meds:    Current Outpatient Prescriptions:      ammonium lactate (LAC-HYDRIN) 12 % lotion, Apply topically as needed for dry skin., Disp: , Rfl:      cetirizine (ZYRTEC) 10 MG tablet, Take 10 mg by mouth daily., Disp: , Rfl:      ondansetron (ZOFRAN ODT) 4 MG disintegrating tablet, Take 1 tablet (4 mg total) by mouth every 8 (eight) hours as needed for nausea.,  Disp: 8 tablet, Rfl: 0     triamcinolone acetonide 0.025 % Lotn, Apply topically., Disp: , Rfl:     Current Facility-Administered Medications:      medroxyPROGESTERone injection 150 mg (DEPO-PROVERA), 150 mg, Intramuscular, Q3 Months, Alicia Hall PA-C, 150 mg at 06/29/18 1159    Allergies:  Allergies   Allergen Reactions     Amoxicillin Rash       ROS:  Pertinent positives as noted in HPI; otherwise 12 point ROS negative.      Physical Exam:  EXAM:  /60 (Patient Site: Left Arm, Patient Position: Sitting, Cuff Size: Adult Regular)  Pulse 86  Temp 98.3  F (36.8  C) (Oral)   Resp 20  Wt 121 lb 5 oz (55 kg)  Breastfeeding? No  BMI 22.55 kg/m2   Gen:  NAD, appears well, well-hydrated  HEENT:  TMs nl, oropharynx benign, nasal mucosa nl, conjunctiva clear  Neck:  Supple, no adenopathy, no thyromegaly, no carotid bruits, no JVD  Lungs:  Clear to auscultation bilaterally  Cor:  RRR no murmur  Abd:  Soft, nontender, BS+, no masses, no guarding or rebound, no HSM  Extr:  Neg.  Neuro:  No asymmetry  Skin:  Warm/dry        Results:

## 2021-06-20 ENCOUNTER — HEALTH MAINTENANCE LETTER (OUTPATIENT)
Age: 21
End: 2021-06-20

## 2021-06-20 NOTE — LETTER
Letter by Jennifer Olvera MD at      Author: Jennifer Olvera MD Service: -- Author Type: --    Filed:  Encounter Date: 5/4/2020 Status: (Other)         05/04/20    RE: Sydney Mcintosh  2000    To Whom It May Concern:    Sydney Mcintosh is still recovering from her recent hospitalization (April 28 - April 30).  We anticipate return to work on Thursday May 7, 2020, assuming symptoms improve.      Thank you for your attention to this matter.     Sincerely,        Jennifer Olvera MD

## 2021-06-20 NOTE — LETTER
Letter by Barbara Barbosa CHW at      Author: Barbara Barbosa CHW Service: -- Author Type: --    Filed:  Encounter Date: 9/21/2020 Status: (Other)       CARE COORDINATION    [facility]   [address] [place] [address]    September 30, 2020    Sydney Mcintosh  [address]  [place] [address]      Dear Sydney,    I am a clinic community health worker who works with Jennifer Olvera MD at [facility]. I have been trying to reach you recently to introduce Clinic Care Coordination and to see if there was anything I could assist you with.  Below is a description of clinic care coordination and how I can further assist you.      The clinic care coordination team is made up of a registered nurse,  and community health worker who understand the health care system. The goal of clinic care coordination is to help you manage your health and improve access to the health care system in the most efficient manner. The team can assist you in meeting your health care goals by providing education, coordinating services, strengthening the communication among your providers and supporting you with any resource needs.    Please feel free to contact me at [phone] with any questions or concerns. We are focused on providing you with the highest-quality healthcare experience possible and that all starts with you.       Sincerely,      ENEDINA Durand   Clinic Care Coordination  [facility]

## 2021-06-20 NOTE — PROGRESS NOTES
Subjective:      Sydney Mcintosh is a 18 y.o. female who presents for evaluation of STD screen.  No known STD contacts.  No STD symptoms like abnormal vaginal discharge or pain with urination.    Patient Active Problem List   Diagnosis     Curvature Of Spine (Acquired) (Idiopathic)     Dysmenorrhea     Contraception management - Depo        Current Outpatient Prescriptions:      ammonium lactate (LAC-HYDRIN) 12 % lotion, Apply topically as needed for dry skin., Disp: , Rfl:      cetirizine (ZYRTEC) 10 MG tablet, Take 10 mg by mouth daily., Disp: , Rfl:      ondansetron (ZOFRAN ODT) 4 MG disintegrating tablet, Take 1 tablet (4 mg total) by mouth every 8 (eight) hours as needed for nausea., Disp: 8 tablet, Rfl: 0     triamcinolone acetonide 0.025 % Lotn, Apply topically., Disp: , Rfl:     Current Facility-Administered Medications:      medroxyPROGESTERone injection 150 mg (DEPO-PROVERA), 150 mg, Intramuscular, Q3 Months, Alicia Hall PA-C, 150 mg at 06/29/18 1159    Objective:     Allergies:  Amoxicillin    Vitals:    09/13/18 1133   BP: 110/58   Pulse: 88   Resp: 16   Temp: 99  F (37.2  C)     Body mass index is 23.05 kg/(m^2).    General: Alert and oriented x 3, in no apparent distress    Gonorrhea and Chlamydia urine tests are pending.    Assessment and Plan:     1.  STD screening.  We will inform her of results when available.  She declines HIV or syphilis testing.  She declines pelvic exam for trichomonas test.  She is using Depo-Provera for contraception.    This dictation uses voice recognition software, which may contain typographical errors.

## 2021-06-23 NOTE — PROGRESS NOTES
Subjective: This patient comes in for evaluation this patient is a 18-year-old female she is had some mild nasal congestion and sore throat.  No significant fever    Is been going on for 3 days    She was exposed to her cousin who has strep and her cousin's kids.    Patient denies any significant neck pain    No colored phlegm productive of colored nasal drainage.        Tobacco status: She  reports that  has never smoked. she has never used smokeless tobacco.    Patient Active Problem List    Diagnosis Date Noted     Chlamydia 09/17/2018     Contraception management - Depo 05/16/2018     Curvature Of Spine (Acquired) (Idiopathic)      Dysmenorrhea        Current Outpatient Medications   Medication Sig Dispense Refill     ammonium lactate (LAC-HYDRIN) 12 % lotion Apply topically as needed for dry skin.       cetirizine (ZYRTEC) 10 MG tablet Take 10 mg by mouth daily.       omeprazole (PRILOSEC) 20 MG capsule   0     ondansetron (ZOFRAN ODT) 4 MG disintegrating tablet Take 1 tablet (4 mg total) by mouth every 8 (eight) hours as needed for nausea. 8 tablet 0     azithromycin (ZITHROMAX) 500 MG tablet Take 2 tablets by mouth once. 2 tablet 0     dicyclomine (BENTYL) 10 MG capsule   0     ondansetron (ZOFRAN) 4 MG tablet   0     sucralfate (CARAFATE) 1 gram tablet   0     triamcinolone acetonide 0.025 % Lotn Apply topically.       Current Facility-Administered Medications   Medication Dose Route Frequency Provider Last Rate Last Dose     medroxyPROGESTERone injection 150 mg (DEPO-PROVERA)  150 mg Intramuscular Q3 Months Alicia Hall PA-C   150 mg at 12/20/18 1545       ROS: 10 point review of systems negative, other than as outlined above.    Objective:    /58 (Patient Site: Right Arm, Patient Position: Sitting, Cuff Size: Adult Regular)   Pulse 100   Temp 99.9  F (37.7  C) (Oral)   Wt 126 lb (57.2 kg)   BMI 23.42 kg/m    Body mass index is 23.42 kg/m .      General appearance no acute  distress    Neck without adenopathy oropharynx was significant for some posterior pharyngeal erythema.  She has had a tonsillectomy.    No exudate    Canals and TMs normal    Nose with some mild clear drainage    Lungs are clear no rales or rhonchi, heart was regular rate at 100.  Low-grade temp at 99.9 noted    Results for orders placed or performed in visit on 01/31/19   Rapid Strep A Screen- Throat Swab   Result Value Ref Range    Rapid Strep A Antigen No Group A Strep detected, presumptive negative No Group A Strep detected, presumptive negative       Assessment:  1. Viral pharyngitis     2. Sore throat  Rapid Strep A Screen- Throat Swab    Group A Strep, RNA Direct Detection, Throat     Viral pharyngitis no antibiotics given use Tylenol push fluids use lozenges    She should be contacted if strep comes back positive    She will let us know if symptoms persist or new symptoms develop    Plan: As outlined above    This transcription uses voice recognition software, which may contain typographical errors.

## 2021-07-23 ENCOUNTER — OFFICE VISIT (OUTPATIENT)
Dept: FAMILY MEDICINE | Facility: CLINIC | Age: 21
End: 2021-07-23
Payer: COMMERCIAL

## 2021-07-23 DIAGNOSIS — R11.0 NAUSEA: ICD-10-CM

## 2021-07-23 DIAGNOSIS — Z30.42 ENCOUNTER FOR SURVEILLANCE OF INJECTABLE CONTRACEPTIVE: ICD-10-CM

## 2021-07-23 DIAGNOSIS — B37.31 YEAST INFECTION OF THE VAGINA: ICD-10-CM

## 2021-07-23 DIAGNOSIS — N76.1 SUBACUTE VAGINITIS: Primary | ICD-10-CM

## 2021-07-23 LAB
CLUE CELLS: ABNORMAL
HIV 1+2 AB+HIV1 P24 AG SERPL QL IA: NEGATIVE
TRICHOMONAS, WET PREP: ABNORMAL
WBC'S/HIGH POWER FIELD, WET PREP: ABNORMAL
YEAST, WET PREP: PRESENT

## 2021-07-23 PROCEDURE — 86803 HEPATITIS C AB TEST: CPT | Performed by: FAMILY MEDICINE

## 2021-07-23 PROCEDURE — 90715 TDAP VACCINE 7 YRS/> IM: CPT | Performed by: FAMILY MEDICINE

## 2021-07-23 PROCEDURE — 99214 OFFICE O/P EST MOD 30 MIN: CPT | Performed by: FAMILY MEDICINE

## 2021-07-23 PROCEDURE — 87389 HIV-1 AG W/HIV-1&-2 AB AG IA: CPT | Performed by: FAMILY MEDICINE

## 2021-07-23 PROCEDURE — 96372 THER/PROPH/DIAG INJ SC/IM: CPT | Performed by: FAMILY MEDICINE

## 2021-07-23 PROCEDURE — 87491 CHLMYD TRACH DNA AMP PROBE: CPT | Performed by: FAMILY MEDICINE

## 2021-07-23 PROCEDURE — 90471 IMMUNIZATION ADMIN: CPT | Performed by: FAMILY MEDICINE

## 2021-07-23 PROCEDURE — 87210 SMEAR WET MOUNT SALINE/INK: CPT | Performed by: FAMILY MEDICINE

## 2021-07-23 PROCEDURE — 36415 COLL VENOUS BLD VENIPUNCTURE: CPT | Performed by: FAMILY MEDICINE

## 2021-07-23 PROCEDURE — 87591 N.GONORRHOEAE DNA AMP PROB: CPT | Performed by: FAMILY MEDICINE

## 2021-07-23 RX ORDER — FLUCONAZOLE 150 MG/1
150 TABLET ORAL WEEKLY
Qty: 2 TABLET | Refills: 0 | Status: SHIPPED | OUTPATIENT
Start: 2021-07-23 | End: 2021-07-31

## 2021-07-23 RX ORDER — ONDANSETRON 4 MG/1
4 TABLET, ORALLY DISINTEGRATING ORAL EVERY 12 HOURS PRN
Qty: 20 TABLET | Refills: 0 | Status: CANCELLED | OUTPATIENT
Start: 2021-07-23

## 2021-07-23 RX ORDER — MEDROXYPROGESTERONE ACETATE 150 MG/ML
150 INJECTION, SUSPENSION INTRAMUSCULAR
Status: COMPLETED | OUTPATIENT
Start: 2021-07-23 | End: 2021-10-20

## 2021-07-23 RX ADMIN — MEDROXYPROGESTERONE ACETATE 150 MG: 150 INJECTION, SUSPENSION INTRAMUSCULAR at 11:05

## 2021-07-23 NOTE — PATIENT INSTRUCTIONS
Patient Education     Preventing Vaginitis     Use mild, unscented soap when you bathe or shower to avoid irritating your vagina.    Vaginitis is irritation or infection of the vagina or the outside opening of it (vulva). Vaginitis can be caused by bacteria, viruses, parasites, or yeast. Chemicals such as in perfumes or soaps or in spermicides can sometimes be a cause. Vaginitis can be caused by hormone changes in pregnancy or with menopause. You can help prevent vaginitis. Follow the tips below. And see your healthcare provider if you have any symptoms.   Hygiene    Stay away from chemicals. Don't use vaginal sprays. Don't use scented toilet paper or tampons that are scented. Sprays and scents have chemicals that can irritate your vagina.    Don't douche unless you are told to by your healthcare provider. Douching is rarely needed. And it upsets the normal balance in the vagina.    Wash yourself well. Wash the outer vaginal area (vulva) every day with mild, unscented soap. Keep it as dry as possible.    Wipe correctly. Make sure to wipe from front to back after a bowel movement. This helps keep from spreading bacteria from your anus to your vagina.    Change your tampon often. During your period, make sure to change your tampon as often as directed on the package. This allows the normal flow of vaginal discharge and blood.    Lifestyle    Limit your number of sexual partners. The more partners you have, the greater your risk of infection. Using condoms helps reduce your risk.    Get enough sleep. Sleep helps keep your body s immune system healthy. This helps you fight infection.    Lose weight, if needed. Excess weight can reduce air circulation around your vagina. This can increase your risk of infection.    Exercise regularly. Regular activity helps keep your body healthy.    Take antibiotics only as directed. Antibiotics can change the normal chemical balance in the vagina.      Clothing    Don t sit in wet  clothes. Yeast thrives when it s warm and damp.    Don t wear tight pants. And don t wear tights, leggings, or hose without a cotton crotch. These types of clothing trap warmth and moisture.    Wear cotton underwear. Cotton lets air circulate around the vagina.    Symptoms of vaginitis    Irritation, swelling, or itching of the genital area    Vaginal discharge    Bad vaginal odor    Pain or burning during urination    [employer] last reviewed this educational content on 11/1/2019 2000-2021 [employer]. All rights reserved. This information is not intended as a substitute for professional medical care. Always follow your healthcare professional's instructions.

## 2021-07-23 NOTE — PROGRESS NOTES
ASSESSMENT:    ICD-10-CM    1. Subacute vaginitis  N76.1 Wet prep - Clinic Collect     Chlamydia & Gonorrhea by PCR, GICH/Range - Clinic Collect     HIV Antigen Antibody Combo     Hepatitis C antibody     HIV Antigen Antibody Combo     Hepatitis C antibody   2. Nausea  R11.0    3. Encounter for surveillance of injectable contraceptive  Z30.42 medroxyPROGESTERone (DEPO-PROVERA) injection 150 mg   4. Yeast infection of the vagina  B37.3 fluconazole (DIFLUCAN) 150 MG tablet       Vaginitis - yeast    PLAN:  1) discussed usual treatment for yeast vaginitis.  Patient would prefer oral pills for yeast or BV.  STD testing as above.  2) discussed further evaluation of nausea.  She takes Zofran as needed.  I would like her to follow-up with her primary MD and discussed usual causes including gastritis.  Patient agrees.  3) Recheck if symptoms persist, worsen, or new symptoms develop.    PE: Discussed vaginitis, modes of transmission, and rationale for   treatment.        SUBJECTIVE:  Sydney Mcintosh is an 21 year old woman who presents with vaginitis. Symptoms   include discharge described as copious, white, yellow, thick and odorless. Onset of symptoms 2   weeks ago, stable since.  She also has intermittent nausea and in the past has had benefit from Zofran is wondering if she should start this again.  She denies any vomiting but feels that she holds it.  No diarrhea.  No fevers   Predisposing factors: None  Hx of previous vaginitis: rare  Sexually active: yes, single partner, contraception - depoprovera    Current Outpatient Medications   Medication     ammonium lactate (LAC-HYDRIN) 12 % lotion     cetirizine (ZYRTEC) 10 MG tablet     dicyclomine (BENTYL) 10 MG capsule     fluconazole (DIFLUCAN) 150 MG tablet     LORazepam (ATIVAN) 0.5 MG tablet     metoclopramide (REGLAN) 10 MG tablet     omeprazole (PRILOSEC) 20 MG capsule     ondansetron (ZOFRAN ODT) 4 MG disintegrating tablet     triamcinolone acetonide 0.025 % Lotn      Current Facility-Administered Medications   Medication     medroxyPROGESTERone (DEPO-PROVERA) injection 150 mg     Allergies   Allergen Reactions     Amoxicillin Rash       Social History     Tobacco Use     Smoking status: Never Smoker     Smokeless tobacco: Never Used   Substance Use Topics     Alcohol use: No       OBJECTIVE:  There were no vitals taken for this visit.  Pelvic: External genitalia and vagina normal. Bimanual and rectovaginal normal., positive findings:  vaginal discharge - white, curd-like and odorless

## 2021-07-25 LAB
C TRACH DNA SPEC QL PROBE+SIG AMP: NEGATIVE
N GONORRHOEA DNA SPEC QL NAA+PROBE: NEGATIVE

## 2021-07-26 ENCOUNTER — TELEPHONE (OUTPATIENT)
Dept: FAMILY MEDICINE | Facility: CLINIC | Age: 21
End: 2021-07-26

## 2021-07-26 LAB — HCV AB SERPL QL IA: NEGATIVE

## 2021-07-26 NOTE — TELEPHONE ENCOUNTER
----- Message from Brandon Melendez MD sent at 7/23/2021  1:26 PM CDT -----  Please inform patient that test for yeast is positive and I am sending medication Diflucan to the pharmacy as discussed.    Brandon Melendez MD

## 2021-09-28 LAB
ALBUMIN UR-MCNC: 20 MG/DL
APPEARANCE UR: ABNORMAL
BILIRUB UR QL STRIP: NEGATIVE
COLOR UR AUTO: YELLOW
GLUCOSE UR STRIP-MCNC: NEGATIVE MG/DL
HCG UR QL: NEGATIVE
HGB UR QL STRIP: ABNORMAL
HYALINE CASTS: 4 /LPF
INTERNAL QC OK POCT: NORMAL
KETONES UR STRIP-MCNC: NEGATIVE MG/DL
LEUKOCYTE ESTERASE UR QL STRIP: NEGATIVE
NITRATE UR QL: NEGATIVE
PH UR STRIP: 6 [PH] (ref 5–7)
RBC URINE: 4 /HPF
SP GR UR STRIP: 1.01 (ref 1–1.03)
SQUAMOUS EPITHELIAL: 3 /HPF
UROBILINOGEN UR STRIP-MCNC: <2 MG/DL
WBC URINE: 3 /HPF

## 2021-09-28 PROCEDURE — C9803 HOPD COVID-19 SPEC COLLECT: HCPCS

## 2021-09-28 PROCEDURE — 96375 TX/PRO/DX INJ NEW DRUG ADDON: CPT

## 2021-09-28 PROCEDURE — 81025 URINE PREGNANCY TEST: CPT | Performed by: STUDENT IN AN ORGANIZED HEALTH CARE EDUCATION/TRAINING PROGRAM

## 2021-09-28 PROCEDURE — 96361 HYDRATE IV INFUSION ADD-ON: CPT

## 2021-09-28 PROCEDURE — 99285 EMERGENCY DEPT VISIT HI MDM: CPT | Mod: 25

## 2021-09-28 PROCEDURE — 81001 URINALYSIS AUTO W/SCOPE: CPT | Performed by: STUDENT IN AN ORGANIZED HEALTH CARE EDUCATION/TRAINING PROGRAM

## 2021-09-28 PROCEDURE — 81001 URINALYSIS AUTO W/SCOPE: CPT | Performed by: EMERGENCY MEDICINE

## 2021-09-28 PROCEDURE — 96365 THER/PROPH/DIAG IV INF INIT: CPT | Mod: 59

## 2021-09-28 PROCEDURE — 81025 URINE PREGNANCY TEST: CPT | Performed by: EMERGENCY MEDICINE

## 2021-09-28 RX ORDER — ONDANSETRON 4 MG/1
4 TABLET, ORALLY DISINTEGRATING ORAL ONCE
Status: DISCONTINUED | OUTPATIENT
Start: 2021-09-29 | End: 2021-09-29

## 2021-09-28 ASSESSMENT — MIFFLIN-ST. JEOR: SCORE: 1434.47

## 2021-09-29 ENCOUNTER — HOSPITAL ENCOUNTER (EMERGENCY)
Facility: HOSPITAL | Age: 21
Discharge: HOME | End: 2021-09-29
Attending: EMERGENCY MEDICINE | Admitting: EMERGENCY MEDICINE
Payer: COMMERCIAL

## 2021-09-29 ENCOUNTER — APPOINTMENT (OUTPATIENT)
Dept: CT IMAGING | Facility: HOSPITAL | Age: 21
End: 2021-09-29
Attending: EMERGENCY MEDICINE
Payer: COMMERCIAL

## 2021-09-29 VITALS
RESPIRATION RATE: 16 BRPM | HEIGHT: 66 IN | WEIGHT: 145 LBS | HEART RATE: 111 BPM | OXYGEN SATURATION: 98 % | DIASTOLIC BLOOD PRESSURE: 59 MMHG | SYSTOLIC BLOOD PRESSURE: 128 MMHG | BODY MASS INDEX: 23.3 KG/M2 | TEMPERATURE: 98.3 F

## 2021-09-29 DIAGNOSIS — A08.11 GASTROENTERITIS DUE TO NOROVIRUS: ICD-10-CM

## 2021-09-29 DIAGNOSIS — U07.1 INFECTION DUE TO 2019 NOVEL CORONAVIRUS: ICD-10-CM

## 2021-09-29 DIAGNOSIS — K52.9 GASTROENTERITIS: ICD-10-CM

## 2021-09-29 LAB
ANION GAP SERPL CALCULATED.3IONS-SCNC: 10 MMOL/L (ref 5–18)
BASOPHILS # BLD AUTO: 0 10E3/UL (ref 0–0.2)
BASOPHILS NFR BLD AUTO: 0 %
BUN SERPL-MCNC: 7 MG/DL (ref 8–22)
CALCIUM SERPL-MCNC: 10.1 MG/DL (ref 8.5–10.5)
CHLORIDE BLD-SCNC: 99 MMOL/L (ref 98–107)
CO2 SERPL-SCNC: 30 MMOL/L (ref 22–31)
CREAT SERPL-MCNC: 0.66 MG/DL (ref 0.6–1.1)
EOSINOPHIL # BLD AUTO: 0 10E3/UL (ref 0–0.7)
EOSINOPHIL NFR BLD AUTO: 0 %
ERYTHROCYTE [DISTWIDTH] IN BLOOD BY AUTOMATED COUNT: 11.7 % (ref 10–15)
GFR SERPL CREATININE-BSD FRML MDRD: >90 ML/MIN/1.73M2
GLUCOSE BLD-MCNC: 95 MG/DL (ref 70–125)
HCT VFR BLD AUTO: 40 % (ref 35–47)
HGB BLD-MCNC: 14.1 G/DL (ref 11.7–15.7)
IMM GRANULOCYTES # BLD: 0.1 10E3/UL
IMM GRANULOCYTES NFR BLD: 0 %
LYMPHOCYTES # BLD AUTO: 4.4 10E3/UL (ref 0.8–5.3)
LYMPHOCYTES NFR BLD AUTO: 32 %
MCH RBC QN AUTO: 30.2 PG (ref 26.5–33)
MCHC RBC AUTO-ENTMCNC: 35.3 G/DL (ref 31.5–36.5)
MCV RBC AUTO: 86 FL (ref 78–100)
MONOCYTES # BLD AUTO: 1.2 10E3/UL (ref 0–1.3)
MONOCYTES NFR BLD AUTO: 9 %
NEUTROPHILS # BLD AUTO: 8.1 10E3/UL (ref 1.6–8.3)
NEUTROPHILS NFR BLD AUTO: 59 %
NRBC # BLD AUTO: 0 10E3/UL
NRBC BLD AUTO-RTO: 0 /100
PLATELET # BLD AUTO: 551 10E3/UL (ref 150–450)
POTASSIUM BLD-SCNC: 3 MMOL/L (ref 3.5–5)
RBC # BLD AUTO: 4.67 10E6/UL (ref 3.8–5.2)
SARS-COV-2 RNA RESP QL NAA+PROBE: POSITIVE
SODIUM SERPL-SCNC: 139 MMOL/L (ref 136–145)
WBC # BLD AUTO: 13.7 10E3/UL (ref 4–11)

## 2021-09-29 PROCEDURE — 80048 BASIC METABOLIC PNL TOTAL CA: CPT | Performed by: EMERGENCY MEDICINE

## 2021-09-29 PROCEDURE — 258N000003 HC RX IP 258 OP 636: Performed by: EMERGENCY MEDICINE

## 2021-09-29 PROCEDURE — 250N000011 HC RX IP 250 OP 636: Performed by: EMERGENCY MEDICINE

## 2021-09-29 PROCEDURE — 85025 COMPLETE CBC W/AUTO DIFF WBC: CPT | Performed by: EMERGENCY MEDICINE

## 2021-09-29 PROCEDURE — 87635 SARS-COV-2 COVID-19 AMP PRB: CPT | Performed by: EMERGENCY MEDICINE

## 2021-09-29 PROCEDURE — 74177 CT ABD & PELVIS W/CONTRAST: CPT

## 2021-09-29 PROCEDURE — 36415 COLL VENOUS BLD VENIPUNCTURE: CPT | Performed by: EMERGENCY MEDICINE

## 2021-09-29 RX ORDER — LORAZEPAM 2 MG/ML
1 INJECTION INTRAMUSCULAR ONCE
Status: COMPLETED | OUTPATIENT
Start: 2021-09-29 | End: 2021-09-29

## 2021-09-29 RX ORDER — IOPAMIDOL 755 MG/ML
100 INJECTION, SOLUTION INTRAVASCULAR ONCE
Status: COMPLETED | OUTPATIENT
Start: 2021-09-29 | End: 2021-09-29

## 2021-09-29 RX ORDER — KETOROLAC TROMETHAMINE 15 MG/ML
15 INJECTION, SOLUTION INTRAMUSCULAR; INTRAVENOUS ONCE
Status: COMPLETED | OUTPATIENT
Start: 2021-09-29 | End: 2021-09-29

## 2021-09-29 RX ORDER — LORAZEPAM 0.5 MG/1
0.5 TABLET ORAL EVERY 8 HOURS PRN
Qty: 8 TABLET | Refills: 0 | Status: SHIPPED | OUTPATIENT
Start: 2021-09-29 | End: 2023-02-27

## 2021-09-29 RX ORDER — METOCLOPRAMIDE 10 MG/1
TABLET ORAL
Qty: 20 TABLET | Refills: 0 | Status: SHIPPED | OUTPATIENT
Start: 2021-09-29 | End: 2023-04-29

## 2021-09-29 RX ORDER — POTASSIUM CHLORIDE 7.45 MG/ML
10 INJECTION INTRAVENOUS ONCE
Status: COMPLETED | OUTPATIENT
Start: 2021-09-29 | End: 2021-09-29

## 2021-09-29 RX ORDER — HALOPERIDOL 5 MG/ML
2 INJECTION INTRAMUSCULAR ONCE
Status: COMPLETED | OUTPATIENT
Start: 2021-09-29 | End: 2021-09-29

## 2021-09-29 RX ORDER — ONDANSETRON 2 MG/ML
4 INJECTION INTRAMUSCULAR; INTRAVENOUS ONCE
Status: COMPLETED | OUTPATIENT
Start: 2021-09-29 | End: 2021-09-29

## 2021-09-29 RX ORDER — HALOPERIDOL 1 MG/1
1 TABLET ORAL 3 TIMES DAILY
Qty: 21 TABLET | Refills: 0 | Status: SHIPPED | OUTPATIENT
Start: 2021-09-29 | End: 2024-08-28

## 2021-09-29 RX ADMIN — POTASSIUM CHLORIDE 10 MEQ: 7.46 INJECTION, SOLUTION INTRAVENOUS at 03:08

## 2021-09-29 RX ADMIN — SODIUM CHLORIDE 1000 ML: 9 INJECTION, SOLUTION INTRAVENOUS at 01:03

## 2021-09-29 RX ADMIN — LORAZEPAM 1 MG: 2 INJECTION INTRAMUSCULAR; INTRAVENOUS at 02:17

## 2021-09-29 RX ADMIN — IOPAMIDOL 100 ML: 755 INJECTION, SOLUTION INTRAVENOUS at 02:01

## 2021-09-29 RX ADMIN — ONDANSETRON 4 MG: 2 INJECTION INTRAMUSCULAR; INTRAVENOUS at 01:04

## 2021-09-29 RX ADMIN — SODIUM CHLORIDE 250 ML: 9 INJECTION, SOLUTION INTRAVENOUS at 03:35

## 2021-09-29 RX ADMIN — HALOPERIDOL LACTATE 2 MG: 5 INJECTION, SOLUTION INTRAMUSCULAR at 02:17

## 2021-09-29 RX ADMIN — KETOROLAC TROMETHAMINE 15 MG: 15 INJECTION, SOLUTION INTRAMUSCULAR; INTRAVENOUS at 01:07

## 2021-09-29 ASSESSMENT — ENCOUNTER SYMPTOMS
FEVER: 1
APPETITE CHANGE: 1
CHILLS: 1
ENDOCRINE NEGATIVE: 1
EYES NEGATIVE: 1
FATIGUE: 1
COUGH: 1
MYALGIAS: 1
CARDIOVASCULAR NEGATIVE: 1
VOMITING: 1
NAUSEA: 1
SHORTNESS OF BREATH: 1
NEUROLOGICAL NEGATIVE: 1
ABDOMINAL PAIN: 1

## 2021-09-29 NOTE — ED NOTES
Pt is a/o x4. Mother at bedside. C/o abd discomfort and n/v - Toradol iv and zofran iv given. 99% on RA, lung sounds clear.

## 2021-09-29 NOTE — ED PROVIDER NOTES
EMERGENCY DEPARTMENT ENCOUNTER      NAME: Sydney Mcintosh  AGE: 21 year old female  YOB: 2000  MRN: [id number]  EVALUATION DATE & TIME: No admission date for patient encounter.    PCP: Jennifer Olvera    ED PROVIDER: Barbraa Garcia M.D.      Chief Complaint   Patient presents with     Nausea         FINAL IMPRESSION:  1. Infection due to 2019 novel coronavirus    2. Gastroenteritis    3. Gastroenteritis due to norovirus        MEDICAL DECISION MAKIN:20 AM I met with the patient, obtained history, performed an initial exam, and discussed options and plan for diagnostics and treatment here in the ED.   2:17 AM Spoke with lab, who report patient's COVID-19 test resulted positive.   Pertinent Labs & Imaging studies reviewed. (See chart for details)     Sydney Mcintosh is a 21 year oldfemale who presents with nausea and vomiting and known exposure to COVID-19.  [facility] tests done was unable to be completed.  Patient is not vaccinated.  Vital signs show tachycardia but are otherwise normal.  She is dry heaving but not actually vomiting.  Her abdominal exam is tender but the remainder of her exam is normal.  I suspect COVID-19 infection but because of abdominal pain, will get labs and a CT scan to evaluate for a complication such as colitis, appendicitis or cholecystitis as result of her COVID-19 infection.  She will be given fluids, medications for pain and nausea.    Covid test is positive.  CT scan does not show any evidence of other intra-abdominal infection.  Her potassium is slightly low so this will be replaced.  She was given additional treatments with Haldol and Ativan for her nausea and an additional 250 mL bolus on top of her as a milliliter initial bolus.  She will get prescriptions for Haldol and Ativan to go home with.  Additionally, I refilled her prescription for Reglan.  Her urine did not show pregnancy or sign of infection.  I feel that she can continue treatment for  her COVID-19 infection at home with appropriate outpatient prescriptions and quarantine.    At the conclusion of this encounter we discussed warning signs and indications to return to the emergency department.  She understands these warning signs and will return with any concerns.  Note provided for work.     MEDICATIONS GIVEN IN THE EMERGENCY:  Medications   potassium chloride 10 mEq in 100 mL sterile water intermittent infusion (premix) (10 mEq Intravenous New Bag 9/29/21 0308)   0.9% sodium chloride BOLUS (has no administration in time range)   0.9% sodium chloride BOLUS (0 mLs Intravenous Stopped 9/29/21 0248)   ondansetron (ZOFRAN) injection 4 mg (4 mg Intravenous Given 9/29/21 0104)   ketorolac (TORADOL) injection 15 mg (15 mg Intravenous Given 9/29/21 0107)   haloperidol lactate (HALDOL) injection 2 mg (2 mg Intravenous Given 9/29/21 0217)   LORazepam (ATIVAN) injection 1 mg (1 mg Intravenous Given 9/29/21 0217)   iopamidol (ISOVUE-370) solution 100 mL (100 mLs Intravenous Given 9/29/21 0201)       NEW PRESCRIPTIONS STARTED AT TODAY'S ER VISIT:  New Prescriptions    HALOPERIDOL (HALDOL) 1 MG TABLET    Take 1 tablet (1 mg) by mouth 3 times daily for 7 days          =================================================================    HPI    Patient information was obtained from: Patient     Use of : N/A       Sydney YANEZ Dayna is a 21 year old female with a history of chlamydia who presents with nausea.  She has experienced nausea for the past 3 days.  She has known Covid exposure within the last 14 days.  Shortness of breath started tonight.  She did go to [facility] to get a Covid test but they were unable to process her results.  She has been at work for [employer] since onset of her symptoms.  She does have a history of sensitive gastrointestinal tract.  She has not had diarrhea or blood in her stools.  She was having emesis at home but now because she has not been able to tolerate oral hydration, she is  dry heaving.  No blood in her emesis.  She did have fevers at home to 101.4 degrees for which she has been taking Tylenol.  She has an intermittent cough.  Other symptoms include generalized body aches, fatigue, and shortness of breath.      REVIEW OF SYSTEMS   Review of Systems   Constitutional: Positive for appetite change, chills, fatigue and fever.   HENT: Negative.    Eyes: Negative.    Respiratory: Positive for cough and shortness of breath.    Cardiovascular: Negative.    Gastrointestinal: Positive for abdominal pain, nausea and vomiting.   Endocrine: Negative.    Genitourinary: Negative.    Musculoskeletal: Positive for myalgias.   Skin: Negative.    Neurological: Negative.    All other systems reviewed and are negative.       PAST MEDICAL HISTORY:  Past Medical History:   Diagnosis Date     Cervical high risk HPV (human papillomavirus) test positive 12/22/2020 12/22/20 NIL pap, +HR HPV (not 16/18) @ 21 yo. HPV testing not indicated. Pap in 3 years     Chlamydia infection 05/2018       PAST SURGICAL HISTORY:  Past Surgical History:   Procedure Laterality Date     TONSILLECTOMY  2002       CURRENT MEDICATIONS:      Current Facility-Administered Medications:      0.9% sodium chloride BOLUS, 250 mL, Intravenous, Once, Barbara Garcia MD     medroxyPROGESTERone (DEPO-PROVERA) injection 150 mg, 150 mg, Intramuscular, Q90 Days, Brandon Melendez MD, 150 mg at 07/23/21 1105     potassium chloride 10 mEq in 100 mL sterile water intermittent infusion (premix), 10 mEq, Intravenous, Once, Barbara Garcia MD, Last Rate: 100 mL/hr at 09/29/21 0308, 10 mEq at 09/29/21 0308    Current Outpatient Medications:      haloperidol (HALDOL) 1 MG tablet, Take 1 tablet (1 mg) by mouth 3 times daily for 7 days, Disp: 21 tablet, Rfl: 0     LORazepam (ATIVAN) 0.5 MG tablet, Take 1 tablet (0.5 mg) by mouth every 8 hours as needed for nausea or vomiting, Disp: 8 tablet, Rfl: 0     metoclopramide (REGLAN) 10 MG tablet,  "[METOCLOPRAMIDE (REGLAN) 10 MG TABLET] 1 p.o. twice daily for abdominal cramps or nausea, Disp: 20 tablet, Rfl: 0     ammonium lactate (LAC-HYDRIN) 12 % lotion, [AMMONIUM LACTATE (LAC-HYDRIN) 12 % LOTION] Apply topically as needed for dry skin., Disp: , Rfl:      cetirizine (ZYRTEC) 10 MG tablet, [CETIRIZINE (ZYRTEC) 10 MG TABLET] Take 10 mg by mouth as needed.       , Disp: , Rfl:      dicyclomine (BENTYL) 10 MG capsule, [DICYCLOMINE (BENTYL) 10 MG CAPSULE] as needed.       , Disp: , Rfl: 0     omeprazole (PRILOSEC) 20 MG capsule, [OMEPRAZOLE (PRILOSEC) 20 MG CAPSULE] as needed.       , Disp: , Rfl: 0     triamcinolone acetonide 0.025 % Lotn, [TRIAMCINOLONE ACETONIDE 0.025 % LOTN] Apply topically., Disp: , Rfl:     ALLERGIES:  Allergies   Allergen Reactions     Amoxicillin Rash       FAMILY HISTORY:  Family History   Problem Relation Age of Onset     Endometriosis Mother      Thyroid Disease Maternal Grandmother        SOCIAL HISTORY:   Social History     Tobacco Use     Smoking status: Never Smoker     Smokeless tobacco: Never Used   Substance Use Topics     Alcohol use: No     Drug use: No        PHYSICAL EXAM:    Vitals: BP (!) 142/77   Pulse 108   Temp 98.3  F (36.8  C) (Oral)   Resp 20   Ht 1.676 m (5' 6\")   Wt 65.8 kg (145 lb)   SpO2 99%   BMI 23.40 kg/m     General:. Alert and interactive, uncomfortable appearing.  HENT: Oropharynx without erythema or exudates. MMM.  TMs clear bilaterally.  Eyes: Pupils mid-sized and equally reactive.   Neck: Full AROM.  No midline tenderness to palpation.  Cardiovascular: Tachycardic rate and rhythm. Peripheral pulses 2+ bilaterally.  Chest/Pulmonary: Normal work of breathing. Lung sounds clear and equal throughout, no wheezes or crackles. No chest wall tenderness or deformities.  Abdomen: Soft, nondistended. Diffuse tenderness without guarding or rebound. Hyperactive bowel sounds. Observed dry heaving without actual emesis.   Back/Spine: No CVA or midline " tenderness.  Extremities: Normal ROM of all major joints. No lower extremity edema.   Skin: Warm and dry. Normal skin color.   Neuro: Speech clear. CNs grossly intact. Moves all extremities appropriately. Strength and sensation grossly intact to all extremities.   Psych: Normal affect/mood, cooperative, memory appropriate.     LAB:  All pertinent labs reviewed and interpreted.  Labs Ordered and Resulted from Time of ED Arrival Up to the Time of Departure from the ED   ROUTINE UA WITH MICROSCOPIC REFLEX TO CULTURE - Abnormal; Notable for the following components:       Result Value    Appearance Urine Turbid (*)     Blood Urine 0.06 mg/dL (*)     Protein Albumin Urine 20  (*)     RBC Urine 4 (*)     Squamous Epithelials Urine 3 (*)     Hyaline Casts Urine 4 (*)     All other components within normal limits    Narrative:     Urine Culture not indicated   COVID-19 VIRUS (CORONAVIRUS) BY PCR - Abnormal; Notable for the following components:    SARS CoV2 PCR Positive (*)     All other components within normal limits    Narrative:     Testing was performed using the inga  SARS-CoV-2 & Influenza A/B Assay on the inga  Leanne  System.  This test should be ordered for the detection of SARS-COV-2 in individuals who meet SARS-CoV-2 clinical and/or epidemiological criteria. Test performance is unknown in asymptomatic patients.  This test is for in vitro diagnostic use under the FDA EUA for laboratories certified under CLIA to perform moderate and/or high complexity testing. This test has not been FDA cleared or approved.  A negative test does not rule out the presence of PCR inhibitors in the specimen or target RNA in concentration below the limit of detection for the assay. The possibility of a false negative should be considered if the patient's recent exposure or clinical presentation suggests COVID-19.  [facility] are certified under the Clinical Laboratory Improvement Amendments of 1988 (CLIA-88) as  qualified to perform moderate and/or high complexity laboratory testing.   BASIC METABOLIC PANEL - Abnormal; Notable for the following components:    Potassium 3.0 (*)     Urea Nitrogen 7 (*)     All other components within normal limits   CBC WITH PLATELETS AND DIFFERENTIAL - Abnormal; Notable for the following components:    WBC Count 13.7 (*)     Platelet Count 551 (*)     Absolute Immature Granulocytes 0.1 (*)     All other components within normal limits   HCG QUALITATIVE URINE POCT - Normal   PERIPHERAL IV CATHETER   ISTAT HCG QUANTITATIVE PREGNANCY NURSING POCT   CBC WITH PLATELETS & DIFFERENTIAL    Narrative:     The following orders were created for panel order CBC with Platelets & Differential.  Procedure                               Abnormality         Status                     ---------                               -----------         ------                     CBC with platelets and d...[[id number]]  Abnormal            Final result                 Please view results for these tests on the individual orders.       RADIOLOGY:  CT Abdomen Pelvis w Contrast   Final Result   IMPRESSION:    1.  No etiology identified to explain patient's abdominal pain.            I, Pepe Garcia, am serving as a scribe to document services personally performed by Dr. Barbara Garcia  based on my observation and the provider's statements to me. I, Barbara Garcia MD attest that Pepe Garcia is acting in a scribe capacity, has observed my performance of the services and has documented them in accordance with my direction.      Barbara Garcia M.D.  Emergency Medicine  [facility] EMERGENCY DEPARTMENT  [facility] [facility] [address]  [phone]  Dept: [phone]         Barbara Garcia MD  09/29/21 0433

## 2021-09-29 NOTE — ED NOTES
Fluids hung due to c/o pain with potassium.  Pt anxious to go because her boyfriend is waiting for her.  Explained that potassium can not be given faster.  Pt ok with this.

## 2021-09-29 NOTE — ED TRIAGE NOTES
Patient presents to ED for evaluation of nausea for past 3 days, covid exposure and sob. Cousins had covid. Got test a few days ago. [facility].

## 2021-09-29 NOTE — DISCHARGE INSTRUCTIONS
You should remain in quarantine because you are Covid positive for a full 10 days after onset of your symptoms.  You should notify any other family members and work of your Covid status.  Even if you quarantine for 10 days if you are still having symptoms especially of fever, please do not return to work.  You should be fever free without Tylenol or ibuprofen for full 72 hours before returning.    Other family members who have been exposed to you, should quarantine for the next 72 hours and then should have a Covid test performed.  Other people should not have ongoing contact with you or will need to quarantine and/or get Covid tested every 72 hours until your infection has resolved.

## 2021-09-29 NOTE — Clinical Note
Sydney Mcintosh was seen and treated in our emergency department on 9/28/2021.  She may return to work on 10/09/2021.  Sydney has had a positive Covid test.  She needs to be out of work for at least 10 days.  If she is still having symptoms after 10 days, she should continue to stay home from work.     If you have any questions or concerns, please don't hesitate to call.      Barbara Garcia MD

## 2021-10-11 ENCOUNTER — HEALTH MAINTENANCE LETTER (OUTPATIENT)
Age: 21
End: 2021-10-11

## 2021-10-20 ENCOUNTER — OFFICE VISIT (OUTPATIENT)
Dept: FAMILY MEDICINE | Facility: CLINIC | Age: 21
End: 2021-10-20
Payer: COMMERCIAL

## 2021-10-20 VITALS
WEIGHT: 133 LBS | SYSTOLIC BLOOD PRESSURE: 100 MMHG | BODY MASS INDEX: 21.47 KG/M2 | DIASTOLIC BLOOD PRESSURE: 52 MMHG | HEART RATE: 80 BPM | OXYGEN SATURATION: 98 %

## 2021-10-20 DIAGNOSIS — N92.6 IRREGULAR MENSTRUAL BLEEDING: Primary | ICD-10-CM

## 2021-10-20 LAB
HCG UR QL: NEGATIVE
HGB BLD-MCNC: 11.7 G/DL (ref 11.7–15.7)

## 2021-10-20 PROCEDURE — 36415 COLL VENOUS BLD VENIPUNCTURE: CPT | Performed by: FAMILY MEDICINE

## 2021-10-20 PROCEDURE — 85018 HEMOGLOBIN: CPT | Performed by: FAMILY MEDICINE

## 2021-10-20 PROCEDURE — 99213 OFFICE O/P EST LOW 20 MIN: CPT | Performed by: FAMILY MEDICINE

## 2021-10-20 PROCEDURE — 96372 THER/PROPH/DIAG INJ SC/IM: CPT | Performed by: FAMILY MEDICINE

## 2021-10-20 PROCEDURE — 81025 URINE PREGNANCY TEST: CPT | Performed by: FAMILY MEDICINE

## 2021-10-20 RX ADMIN — MEDROXYPROGESTERONE ACETATE 150 MG: 150 INJECTION, SUSPENSION INTRAMUSCULAR at 15:01

## 2021-10-20 NOTE — PROGRESS NOTES
Assessment & Plan    1. Irregular menstrual bleeding  Patient is using Depo for contraception - she had some irregular bleeding - pregnancy test is negative.  Will continue Depo Provera at this time.    - Hemoglobin; Future  - HCG Qual, Urine ([id number]); Future  - Hemoglobin  - HCG Qual, Urine ([id number])      Return in about 3 months (around 1/20/2022), or if symptoms worsen or fail to improve - if continues to have irregular bleeding, for Follow up.    Chief Complaint   Patient presents with     irregular period     depo shot      HPI  Had COVID about a month ago -   Since then, has had spotting -   Recently , really heavy   Normally no periods on Depo -   Taking Depo regularly - due now.  Has had bleeding since COVID     If she does have bleeding - its usually dark brown     Working at [employer] now -  -          Patient Active Problem List   Diagnosis     Curvature Of Spine (Acquired) (Idiopathic)     Dysmenorrhea     Contraception management - Depo     Chlamydia     Cervical high risk HPV (human papillomavirus) test positive        Past Medical History:   Diagnosis Date     Cervical high risk HPV (human papillomavirus) test positive 12/22/2020 12/22/20 NIL pap, +HR HPV (not 16/18) @ 21 yo. HPV testing not indicated. Pap in 3 years     Chlamydia infection 05/2018        Current Outpatient Medications   Medication     ammonium lactate (LAC-HYDRIN) 12 % lotion     cetirizine (ZYRTEC) 10 MG tablet     dicyclomine (BENTYL) 10 MG capsule     haloperidol (HALDOL) 1 MG tablet     LORazepam (ATIVAN) 0.5 MG tablet     metoclopramide (REGLAN) 10 MG tablet     omeprazole (PRILOSEC) 20 MG capsule     triamcinolone acetonide 0.025 % Lotn     No current facility-administered medications for this visit.        Past Surgical History:   Procedure Laterality Date     TONSILLECTOMY  2002        Social History     Socioeconomic History     Marital status: Single     Spouse name: Not on file     Number of children: Not on file      Years of education: Not on file     Highest education level: Not on file   Occupational History     Not on file   Tobacco Use     Smoking status: Never Smoker     Smokeless tobacco: Never Used   Substance and Sexual Activity     Alcohol use: No     Drug use: No     Sexual activity: Yes     Partners: Male     Birth control/protection: Injection   Other Topics Concern     Not on file   Social History Narrative    Lives at home     Social Determinants of Health     Financial Resource Strain:      Difficulty of Paying Living Expenses:    Food Insecurity:      Worried About Running Out of Food in the Last Year:      Ran Out of Food in the Last Year:    Transportation Needs:      Lack of Transportation (Medical):      Lack of Transportation (Non-Medical):    Physical Activity:      Days of Exercise per Week:      Minutes of Exercise per Session:    Stress:      Feeling of Stress :    Social Connections:      Frequency of Communication with Friends and Family:      Frequency of Social Gatherings with Friends and Family:      Attends [employer] Services:      Active Member of Clubs or Organizations:      Attends Club or Organization Meetings:      Marital Status:    Intimate Partner Violence:      Fear of Current or Ex-Partner:      Emotionally Abused:      Physically Abused:      Sexually Abused:         Family History   Problem Relation Age of Onset     Endometriosis Mother      Thyroid Disease Maternal Grandmother         Review of Systems   Genitourinary: Positive for menstrual problem (irregular bleeding - usually no bleeding on Depo).   All other systems reviewed and are negative.       /52 (BP Location: Right arm, Patient Position: Sitting, Cuff Size: Adult Regular)   Pulse 80   Wt 60.3 kg (133 lb)   LMP 10/20/2021   SpO2 98%   BMI 21.47 kg/m       Physical Exam  Constitutional:       General: She is not in acute distress.     Appearance: She is well-developed.   HENT:      Right Ear: Tympanic membrane and  external ear normal.      Left Ear: Tympanic membrane and external ear normal.      Nose: Nose normal.      Mouth/Throat:      Pharynx: No oropharyngeal exudate.   Eyes:      General:         Right eye: No discharge.         Left eye: No discharge.      Conjunctiva/sclera: Conjunctivae normal.      Pupils: Pupils are equal, round, and reactive to light.   Neck:      Thyroid: No thyromegaly.      Trachea: No tracheal deviation.   Cardiovascular:      Rate and Rhythm: Normal rate and regular rhythm.      Pulses: Normal pulses.      Heart sounds: Normal heart sounds, S1 normal and S2 normal. No murmur heard.   No friction rub. No S3 or S4 sounds.    Pulmonary:      Effort: Pulmonary effort is normal. No respiratory distress.      Breath sounds: Normal breath sounds. No wheezing or rales.   Abdominal:      General: Bowel sounds are normal.      Palpations: Abdomen is soft. There is no mass.      Tenderness: There is no abdominal tenderness.   Musculoskeletal:         General: Normal range of motion.      Cervical back: Neck supple.   Lymphadenopathy:      Cervical: No cervical adenopathy.   Skin:     General: Skin is warm and dry.      Findings: No rash.   Neurological:      Mental Status: She is alert and oriented to person, place, and time.      Motor: No abnormal muscle tone.      Deep Tendon Reflexes: Reflexes are normal and symmetric.   Psychiatric:         Thought Content: Thought content normal.         Judgment: Judgment normal.          Results:  Results for orders placed or performed in visit on 10/20/21   Hemoglobin     Status: Normal   Result Value Ref Range    Hemoglobin 11.7 11.7 - 15.7 g/dL   HCG Qual, Urine ([id number])     Status: Normal   Result Value Ref Range    hCG Urine Qualitative Negative Negative       Medications at Conclusion of Visit:  Current Outpatient Medications   Medication Sig Dispense Refill     ammonium lactate (LAC-HYDRIN) 12 % lotion [AMMONIUM LACTATE (LAC-HYDRIN) 12 % LOTION] Apply  topically as needed for dry skin. (Patient not taking: Reported on 10/20/2021)       cetirizine (ZYRTEC) 10 MG tablet [CETIRIZINE (ZYRTEC) 10 MG TABLET] Take 10 mg by mouth as needed.        (Patient not taking: Reported on 10/20/2021)       dicyclomine (BENTYL) 10 MG capsule [DICYCLOMINE (BENTYL) 10 MG CAPSULE] as needed.        (Patient not taking: Reported on 10/20/2021)  0     haloperidol (HALDOL) 1 MG tablet Take 1 tablet (1 mg) by mouth 3 times daily for 7 days 21 tablet 0     LORazepam (ATIVAN) 0.5 MG tablet Take 1 tablet (0.5 mg) by mouth every 8 hours as needed for nausea or vomiting (Patient not taking: Reported on 10/20/2021) 8 tablet 0     metoclopramide (REGLAN) 10 MG tablet [METOCLOPRAMIDE (REGLAN) 10 MG TABLET] 1 p.o. twice daily for abdominal cramps or nausea (Patient not taking: Reported on 10/20/2021) 20 tablet 0     omeprazole (PRILOSEC) 20 MG capsule [OMEPRAZOLE (PRILOSEC) 20 MG CAPSULE] as needed.        (Patient not taking: Reported on 10/20/2021)  0     triamcinolone acetonide 0.025 % Lotn [TRIAMCINOLONE ACETONIDE 0.025 % LOTN] Apply topically. (Patient not taking: Reported on 10/20/2021)           BRODERICK GEORGE MD

## 2021-12-25 ENCOUNTER — OFFICE VISIT (OUTPATIENT)
Dept: FAMILY MEDICINE | Facility: CLINIC | Age: 21
End: 2021-12-25
Payer: COMMERCIAL

## 2021-12-25 VITALS
TEMPERATURE: 98.3 F | OXYGEN SATURATION: 98 % | HEART RATE: 123 BPM | SYSTOLIC BLOOD PRESSURE: 134 MMHG | RESPIRATION RATE: 18 BRPM | DIASTOLIC BLOOD PRESSURE: 87 MMHG

## 2021-12-25 DIAGNOSIS — R35.0 URINARY FREQUENCY: Primary | ICD-10-CM

## 2021-12-25 DIAGNOSIS — L30.9 ECZEMA, UNSPECIFIED TYPE: ICD-10-CM

## 2021-12-25 DIAGNOSIS — Z13.9 SCREENING FOR CONDITION: ICD-10-CM

## 2021-12-25 LAB
ALBUMIN UR-MCNC: NEGATIVE MG/DL
APPEARANCE UR: CLEAR
BACTERIA #/AREA URNS HPF: ABNORMAL /HPF
BILIRUB UR QL STRIP: NEGATIVE
COLOR UR AUTO: YELLOW
GLUCOSE UR STRIP-MCNC: NEGATIVE MG/DL
HCG UR QL: NEGATIVE
HGB UR QL STRIP: ABNORMAL
KETONES UR STRIP-MCNC: NEGATIVE MG/DL
LEUKOCYTE ESTERASE UR QL STRIP: NEGATIVE
MUCOUS THREADS #/AREA URNS LPF: PRESENT /LPF
NITRATE UR QL: NEGATIVE
PH UR STRIP: 5 [PH] (ref 5–8)
RBC #/AREA URNS AUTO: ABNORMAL /HPF
SP GR UR STRIP: >=1.03 (ref 1–1.03)
SQUAMOUS #/AREA URNS AUTO: ABNORMAL /LPF
UROBILINOGEN UR STRIP-ACNC: 0.2 E.U./DL
WBC #/AREA URNS AUTO: ABNORMAL /HPF

## 2021-12-25 PROCEDURE — 81025 URINE PREGNANCY TEST: CPT | Performed by: PHYSICIAN ASSISTANT

## 2021-12-25 PROCEDURE — 99213 OFFICE O/P EST LOW 20 MIN: CPT | Performed by: PHYSICIAN ASSISTANT

## 2021-12-25 PROCEDURE — 81001 URINALYSIS AUTO W/SCOPE: CPT | Performed by: PHYSICIAN ASSISTANT

## 2021-12-25 RX ORDER — ONDANSETRON 4 MG/1
TABLET, FILM COATED ORAL EVERY 8 HOURS PRN
COMMUNITY
End: 2023-02-27

## 2021-12-25 RX ORDER — MEDROXYPROGESTERONE ACETATE 150 MG/ML
150 INJECTION, SUSPENSION INTRAMUSCULAR
COMMUNITY

## 2021-12-25 NOTE — PATIENT INSTRUCTIONS
Your urine test did not show that you have a urinary tract infection    Your skin rash may be treated with over-the-counter Eucerin Aquaphor  Apply the ointment after bathing to help keep moisture in the skin.    Pregnancy test was negative.    You may get screening for sexually transmitted diseases if you are concerned with the burning with urination. This was not tested in today's appointment.    Follow-up with your primary care provider if you are still having trouble with your Depo or worsening bleeding.        Patient Education     Dysuria with Uncertain Cause (Adult)    The urethra is the tube that allows urine to pass out of the body. In a woman, the urethra is the opening above the vagina. In men, the urethra is the opening on the tip of the penis. Dysuria is the feeling of pain or burning in the urethra when passing urine.   Dysuria can be caused by anything that irritates or inflames the urethra. An infection or chemical irritation can cause this reaction. A bladder infection is the most common cause of dysuria in adults. A urine test can diagnose this. A bladder infection needs antibiotic treatment.   Soaps, lotions, colognes, and feminine hygiene products can cause dysuria. So can birth control jellies, creams, and foams. It will go away 1 to 3 days after using these irritants.   Sexually transmitted infections (STIs) such as chlamydia or gonorrhea can cause dysuria. Your healthcare provider may take a culture sample. Your provider may start you on antibiotic medicine before the culture test returns.   In women who have gone through menopause, dysuria can be from dryness in the lining of the urethra. This can be treated with hormones. Dysuria becomes long-term (chronic) when it lasts for weeks or months. You may need to see a specialist (urologist) to diagnose and treat chronic dysuria.   Home care  These home care tips may help:    Don't use any chemicals or products that you think may be causing your  symptoms.    If you were given a prescription medicine, take as directed. Take it until it is all used up.    If a culture was taken, don't have sex until you have been told that it is negative. A negative culture means you don't have an infection. Then follow your healthcare provider's advice to treat your condition.  If a culture was done and it is positive:     Both you and your sexual partner may need to be treated. This is true even if your partner has no symptoms.    Contact your healthcare provider or go to an urgent care clinic or the public health department to be looked at and treated.    Don't have sex until both you and your partner have finished all antibiotics and your healthcare provider says you are no longer contagious.    Learn about and use safe sex practices. The safest sex is with a partner who has tested negative and only has sex with you. Condoms can prevent STIs from spreading, but they aren't a guarantee.  Follow-up care  Follow up with your healthcare provider, or as advised. If a culture was taken, you may call as directed for the results. If you have an STI, follow up with your provider or the public health department for a complete STI screening, including HIV testing. For more information, contact CDC-INFO at 699-879-0177.   When to get medical advice  Call your healthcare provider right away if any of these occur:    You aren't better after 3 days of treatment    Fever of 100.4 F (38 C) or higher, or as directed by your provider    Back or belly pain that gets worse    You can't urinate because of pain    New discharge from the urethra, vagina, or penis    Painful sores on the penis    Rash or joint pain    Painful lumps (lymph nodes) in the groin    Testicle pain or swelling of the scrotum  [employer] last reviewed this educational content on 10/1/2019    9175-9802 [employer]. All rights reserved. This information is not intended as a substitute for professional medical  care. Always follow your healthcare professional's instructions.

## 2021-12-25 NOTE — PROGRESS NOTES
Patient presents with:  Urinary Frequency: [id number], different urine odor, irregular periods, burning sensation when urinating, rash, vomiting, can't keep food down, stomach cramping      Clinical Decision Making:  I had a conversation the patient stating that the urine test did not show findings for urinary tract infection.  Patient will monitor her symptoms and handout on urinary dysuria causes.  Patient will monitor her symptoms with the vaginal bleeding.  Patient states that she is on her menses.  There was not a preponderance of blood found on the urinary test.  Advised her that it is possible to have bleeding on Depo.  She will continue to monitor her symptoms and follow-up with her primary care provider for reevaluation if not getting good resolution or if worsening or new symptoms present.  In regard to her eczema with the rash she can use topical Eucerin Aquaphor for emollient.  Questions were answered to her satisfaction before discharge.    Patient admits to vaping and having nicotine in her vaping.    30 min spent on the date of the encounter in chart review, patient visit, review of tests, documentation and/ordiscussion with other providers about the issues documented above.         ICD-10-CM    1. Urinary frequency  R35.0 UA macro with reflex to Microscopic and Culture - Clinc Collect     Urine Microscopic     HCG qualitative urine     HCG qualitative urine   2. Eczema, unspecified type  L30.9    3. Screening for condition  Z13.9        Patient Instructions   Your urine test did not show that you have a urinary tract infection    Your skin rash may be treated with over-the-counter Eucerin Aquaphor  Apply the ointment after bathing to help keep moisture in the skin.    Pregnancy test was negative.    You may get screening for sexually transmitted diseases if you are concerned with the burning with urination. This was not tested in today's appointment.    Follow-up with your primary care provider if you  are still having trouble with your Depo or worsening bleeding.        Patient Education     Dysuria with Uncertain Cause (Adult)    The urethra is the tube that allows urine to pass out of the body. In a woman, the urethra is the opening above the vagina. In men, the urethra is the opening on the tip of the penis. Dysuria is the feeling of pain or burning in the urethra when passing urine.   Dysuria can be caused by anything that irritates or inflames the urethra. An infection or chemical irritation can cause this reaction. A bladder infection is the most common cause of dysuria in adults. A urine test can diagnose this. A bladder infection needs antibiotic treatment.   Soaps, lotions, colognes, and feminine hygiene products can cause dysuria. So can birth control jellies, creams, and foams. It will go away 1 to 3 days after using these irritants.   Sexually transmitted infections (STIs) such as chlamydia or gonorrhea can cause dysuria. Your healthcare provider may take a culture sample. Your provider may start you on antibiotic medicine before the culture test returns.   In women who have gone through menopause, dysuria can be from dryness in the lining of the urethra. This can be treated with hormones. Dysuria becomes long-term (chronic) when it lasts for weeks or months. You may need to see a specialist (urologist) to diagnose and treat chronic dysuria.   Home care  These home care tips may help:    Don't use any chemicals or products that you think may be causing your symptoms.    If you were given a prescription medicine, take as directed. Take it until it is all used up.    If a culture was taken, don't have sex until you have been told that it is negative. A negative culture means you don't have an infection. Then follow your healthcare provider's advice to treat your condition.  If a culture was done and it is positive:     Both you and your sexual partner may need to be treated. This is true even if your  partner has no symptoms.    Contact your healthcare provider or go to an urgent care clinic or the public health department to be looked at and treated.    Don't have sex until both you and your partner have finished all antibiotics and your healthcare provider says you are no longer contagious.    Learn about and use safe sex practices. The safest sex is with a partner who has tested negative and only has sex with you. Condoms can prevent STIs from spreading, but they aren't a guarantee.  Follow-up care  Follow up with your healthcare provider, or as advised. If a culture was taken, you may call as directed for the results. If you have an STI, follow up with your provider or the public health department for a complete STI screening, including HIV testing. For more information, contact CDC-INFO at [phone].   When to get medical advice  Call your healthcare provider right away if any of these occur:    You aren't better after 3 days of treatment    Fever of 100.4 F (38 C) or higher, or as directed by your provider    Back or belly pain that gets worse    You can't urinate because of pain    New discharge from the urethra, vagina, or penis    Painful sores on the penis    Rash or joint pain    Painful lumps (lymph nodes) in the groin    Testicle pain or swelling of the scrotum  [employer] last reviewed this educational content on 10/1/2019    2455-2403 [employer]. All rights reserved. This information is not intended as a substitute for professional medical care. Always follow your healthcare professional's instructions.               HPI:  Sydney Mcintosh is a 21 year old female who presents today for three complaints.    Her first complaint is for a rash that is on the popliteal fossa of the bilateral knees and on the anterior medial proximal thighs.  She states that she does have a history of eczema and that it has been itchy.  Has not tried treatment for this at this time.  He also has not had  involvement or spreading to other areas.    Patient second complaint is that she may have a urinary tract infection.  She is complaining that she had irritative voiding symptoms to include urinary hesitancy urgency and frequency.  She shares that she has her period and that there has been blood in the urine.    Patient's last concern is that she is on the Depo-Provera for contraception.  Patient has got her Depo shot in October 2021 and is due to have vaginal bleeding that has been over the last week.  Is been intermittent.  Today the patient states that she has had 3 pads that she has had to change.  The first pad was a thin maxi pad that had some leaking her second pad was another thin pad and then a third 1 was a maxi pad that is took 4 to 5 hours to fill before she changed it.  She currently does not feel lightheaded or dizzy no syncopal or presyncopal feelings.  She states that she is having her menses with the Depo but has not passed any large clots.  She has not had abdominal or pelvic pain.    History obtained from chart review and the patient.    Problem List:  2020-12: Cervical high risk HPV (human papillomavirus) test positive  2018-09: Chlamydia  2018-05: Contraception management - Depo  Curvature Of Spine (Acquired) (Idiopathic)  Dysmenorrhea      Past Medical History:   Diagnosis Date     Cervical high risk HPV (human papillomavirus) test positive 12/22/2020 12/22/20 NIL pap, +HR HPV (not 16/18) @ 21 yo. HPV testing not indicated. Pap in 3 years     Chlamydia infection 05/2018       Social History     Tobacco Use     Smoking status: Never Smoker     Smokeless tobacco: Never Used     Tobacco comment: Vapes   Substance Use Topics     Alcohol use: No       Review of Systems  As above in HPI otherwise negative.    Vitals:    12/25/21 1633   BP: 134/87   Pulse: (!) 123   Resp: 18   Temp: 98.3  F (36.8  C)   TempSrc: Oral   SpO2: 98%     General: Patient is resting comfortably no acute distress is  afebrile  But exhibits akethisia in the office.  HEENT: Head is normocephalic atraumatic   eyes are PERRL EOMI sclera anicteric   TMs are clear bilaterally  Throat is without pharyngeal wall erythema and no exudate  No cervical lymphadenopathy present  LUNGS: Clear to auscultation bilaterally  HEART: Regular rate and rhythm  Abdomen: Nontender nondistended no rebound or guarding no masses no suprapubic tenderness to palpation or induration.  No CVA tenderness to percussion  Skin: Without rash non-diaphoretic      Physical Exam    Labs:  Results for orders placed or performed in visit on 12/25/21   UA macro with reflex to Microscopic and Culture - Clinc Collect     Status: Abnormal    Specimen: Urine, Clean Catch   Result Value Ref Range    Color Urine Yellow Colorless, Straw, Light Yellow, Yellow    Appearance Urine Clear Clear    Glucose Urine Negative Negative mg/dL    Bilirubin Urine Negative Negative    Ketones Urine Negative Negative mg/dL    Specific Gravity Urine >=1.030 1.005 - 1.030    Blood Urine Small (A) Negative    pH Urine 5.0 5.0 - 8.0    Protein Albumin Urine Negative Negative mg/dL    Urobilinogen Urine 0.2 0.2, 1.0 E.U./dL    Nitrite Urine Negative Negative    Leukocyte Esterase Urine Negative Negative   Urine Microscopic     Status: Abnormal   Result Value Ref Range    Bacteria Urine Few (A) None Seen /HPF    RBC Urine 0-2 0-2 /HPF /HPF    WBC Urine 0-5 0-5 /HPF /HPF    Squamous Epithelials Urine Few (A) None Seen /LPF    Mucus Urine Present (A) None Seen /LPF    Narrative    Urine Culture not indicated   HCG qualitative urine     Status: Normal   Result Value Ref Range    hCG Urine Qualitative Negative Negative       At the end of the encounter, I discussed results, diagnosis, medications. Discussed red flags for immediate return to clinic/ER, as well as indications for follow up if no improvement. Patient understood and agreed to plan. Patient was stable for discharge.

## 2022-05-20 ENCOUNTER — OFFICE VISIT (OUTPATIENT)
Dept: FAMILY MEDICINE | Facility: CLINIC | Age: 22
End: 2022-05-20
Payer: COMMERCIAL

## 2022-05-20 ENCOUNTER — HOSPITAL ENCOUNTER (OUTPATIENT)
Facility: CLINIC | Age: 22
Discharge: HOME | End: 2022-05-20
Attending: FAMILY MEDICINE | Admitting: FAMILY MEDICINE
Payer: COMMERCIAL

## 2022-05-20 VITALS
DIASTOLIC BLOOD PRESSURE: 76 MMHG | HEIGHT: 66 IN | TEMPERATURE: 99.8 F | WEIGHT: 125 LBS | BODY MASS INDEX: 20.09 KG/M2 | HEART RATE: 109 BPM | SYSTOLIC BLOOD PRESSURE: 118 MMHG

## 2022-05-20 DIAGNOSIS — B37.31 YEAST VAGINITIS: ICD-10-CM

## 2022-05-20 DIAGNOSIS — N76.0 BACTERIAL VAGINOSIS: ICD-10-CM

## 2022-05-20 DIAGNOSIS — B96.89 BACTERIAL VAGINOSIS: ICD-10-CM

## 2022-05-20 DIAGNOSIS — Z30.42 ENCOUNTER FOR SURVEILLANCE OF INJECTABLE CONTRACEPTIVE: Primary | ICD-10-CM

## 2022-05-20 LAB
CLUE CELLS: PRESENT
HCG UR QL: NEGATIVE
TRICHOMONAS, WET PREP: ABNORMAL
WBC'S/HIGH POWER FIELD, WET PREP: ABNORMAL
YEAST, WET PREP: PRESENT

## 2022-05-20 PROCEDURE — 99213 OFFICE O/P EST LOW 20 MIN: CPT | Mod: 25 | Performed by: FAMILY MEDICINE

## 2022-05-20 PROCEDURE — 87210 SMEAR WET MOUNT SALINE/INK: CPT | Performed by: FAMILY MEDICINE

## 2022-05-20 PROCEDURE — 96372 THER/PROPH/DIAG INJ SC/IM: CPT | Performed by: FAMILY MEDICINE

## 2022-05-20 PROCEDURE — 81025 URINE PREGNANCY TEST: CPT | Performed by: FAMILY MEDICINE

## 2022-05-20 PROCEDURE — 87491 CHLMYD TRACH DNA AMP PROBE: CPT

## 2022-05-20 PROCEDURE — 87591 N.GONORRHOEAE DNA AMP PROB: CPT

## 2022-05-20 RX ORDER — FLUCONAZOLE 150 MG/1
150 TABLET ORAL ONCE
Qty: 1 TABLET | Refills: 0 | Status: SHIPPED | OUTPATIENT
Start: 2022-05-20 | End: 2022-05-20

## 2022-05-20 RX ORDER — METRONIDAZOLE 500 MG/1
500 TABLET ORAL 2 TIMES DAILY
Qty: 14 TABLET | Refills: 0 | Status: SHIPPED | OUTPATIENT
Start: 2022-05-20 | End: 2022-05-27

## 2022-05-20 RX ORDER — MEDROXYPROGESTERONE ACETATE 150 MG/ML
150 INJECTION, SUSPENSION INTRAMUSCULAR
Status: ACTIVE | OUTPATIENT
Start: 2022-05-20 | End: 2023-05-15

## 2022-05-20 RX ADMIN — MEDROXYPROGESTERONE ACETATE 150 MG: 150 INJECTION, SUSPENSION INTRAMUSCULAR at 16:54

## 2022-05-20 NOTE — PROGRESS NOTES
Assessment/plan  1. Encounter for surveillance of injectable contraceptive  - Wet prep - Clinic Collect  - Chlamydia & Gonorrhea by PCR, [facility] - Clinic Collect  - HCG qualitative urine  - medroxyPROGESTERone (DEPO-PROVERA) injection 150 mg  2. Bacterial vaginosis  - metroNIDAZOLE (FLAGYL) 500 MG tablet; Take 1 tablet (500 mg) by mouth 2 times daily for 7 days  Dispense: 14 tablet; Refill: 0  3. Yeast vaginitis  - fluconazole (DIFLUCAN) 150 MG tablet; Take 1 tablet (150 mg) by mouth once for 1 dose  Dispense: 1 tablet; Refill: 0  EHR reviewed.   Past medical history, problem list, past surgical history, family history, social history, medications reviewed, updated, reconciled.   Patient unable to recall last injection date, ordered last July, at any case is late for her depo dose.   We reviewed the usual management including pregnancy testing, repeat in two weeks and injection administration. Since patient notes recent LMP, no recent sexual intercourse, her pregnancy test is valid and would like to obtain her depo today. Encouraged to return in time frame of next injection. Counseled on possible adverse affects. Counseled on safe sexual practices.   STD screening collected as requested.   Some vaginal symptoms. Suspected a yeast infection. Wet prep is remarkable and treated as above.   To follow up as needed for this or routine health maintenance.           Subjective  [age] year old female here with concerns of depo injection and STD screening.   She is aware she is late for her depo injection. She can not recall the last time she got her injection, it must have been several months ago. She denies adverse affects when she was taking the injections. She thinks her LMP is first week of May. She has not been sexually active in the last two weeks.   She notes history of chlamydia and wants to check for this. Is not aware of recent exposure.   Additionally has some thick white discharge. This started about two  or three weeks ago. She wonders if she has a yeast infection. There is some vaginal itching and irritation. No abnormal odor.     ROS: 12 systems reviewed, all negative exceptfor what is mentioned in HPI.   Past Medical History:   Diagnosis Date     Cervical high risk HPV (human papillomavirus) test positive 12/22/2020 12/22/20 NIL pap, +HR HPV (not 16/18) @ 21 yo. HPV testing not indicated. Pap in 3 years     Chlamydia infection 05/2018     Patient Active Problem List   Diagnosis     Curvature Of Spine (Acquired) (Idiopathic)     Dysmenorrhea     Contraception management - Depo     Chlamydia     Cervical high risk HPV (human papillomavirus) test positive     Past Surgical History:   Procedure Laterality Date     TONSILLECTOMY  2002     Family History   Problem Relation Age of Onset     Endometriosis Mother      Thyroid Disease Maternal Grandmother      Social History     Socioeconomic History     Marital status: Single     Spouse name: Not on file     Number of children: Not on file     Years of education: Not on file     Highest education level: Not on file   Occupational History     Not on file   Tobacco Use     Smoking status: Never Smoker     Smokeless tobacco: Never Used     Tobacco comment: Vapes   Substance and Sexual Activity     Alcohol use: No     Drug use: No     Sexual activity: Yes     Partners: Male     Birth control/protection: Injection   Other Topics Concern     Not on file   Social History Narrative    Lives at home     Social Determinants of Health     Financial Resource Strain: Not on file   Food Insecurity: Not on file   Transportation Needs: Not on file   Physical Activity: Not on file   Stress: Not on file   Social Connections: Not on file   Intimate Partner Violence: Not on file   Housing Stability: Not on file     Current Outpatient Medications   Medication     ammonium lactate (LAC-HYDRIN) 12 % lotion     cetirizine (ZYRTEC) 10 MG tablet     dicyclomine (BENTYL) 10 MG capsule      "haloperidol (HALDOL) 1 MG tablet     LORazepam (ATIVAN) 0.5 MG tablet     medroxyPROGESTERone (DEPO-PROVERA) 150 MG/ML IM injection     metoclopramide (REGLAN) 10 MG tablet     omeprazole (PRILOSEC) 20 MG capsule     ondansetron (ZOFRAN) 4 MG tablet     triamcinolone acetonide 0.025 % Lotn     Current Facility-Administered Medications   Medication     medroxyPROGESTERone (DEPO-PROVERA) injection 150 mg     Objective  /76 (BP Location: Left arm, Patient Position: Sitting, Cuff Size: Adult Regular)   Pulse 109   Temp 99.8  F (37.7  C) (Temporal)   Ht 1.676 m (5' 5.98\")   Wt 56.7 kg (125 lb)   BMI 20.19 kg/m      General Appearance:  Alert, cooperative, no distress, appears stated age   Head:  Normocephalic, without obvious abnormality, atraumatic   Eyes:  PERRL, conjunctiva/corneas clear, EOM's intact   Ears:  Normal TM's and external ear canals, both ears   Nose: Nares normal, septum midline,mucosa normal, no drainage    Throat: Lips, mucosa, and tongue normal; teeth and gums normal   Neck: Supple   Lungs:   Clear to auscultation bilaterally, respirations unlabored   Heart:  Regular rate and rhythm, S1 and S2 normal, no murmur, rub, or gallop   Genitalia: Normally developed genitalia with no external lesions or eruptions.  Vagina and cervix show no lesions, inflammation, + white discharge, no tenderness.  No cystocele.  Uterus normal size and position.  No adnexal mass or tenderness.       Lab  Recent Results (from the past 240 hour(s))   HCG qualitative urine    Collection Time: 05/20/22  4:21 PM   Result Value Ref [facility]    hCG Urine Qualitative Negative Negative   Wet prep - Clinic Collect    Collection Time: 05/20/22  4:47 PM    Specimen: Vagina; Swab   Result Value Ref [facility]    Trichomonas Absent Absent    Yeast Present (A) Absent    Clue Cells Present (A) Absent    WBCs/high power field 1+ (A) None   Chlamydia & Gonorrhea by PCR, [facility] - Clinic Collect    Collection Time: 05/20/22  4:47 PM    " Specimen: Cervix; Swab   Result Value Ref Range    Chlamydia Trachomatis Negative Negative    Neisseria gonorrhoeae Negative Negative

## 2022-05-20 NOTE — LETTER
May 20, 2022      Sydney Mcintosh  [address] [place] [address]        To Whom It May Concern:    Sydney Mcintosh  was seen on 05/20/22 at 4:20 pm.   Her temperature was 100.0 degrees F.                Sincerely,        Nahid Garcia MD

## 2022-05-21 LAB
C TRACH DNA SPEC QL PROBE+SIG AMP: NEGATIVE
N GONORRHOEA DNA SPEC QL NAA+PROBE: NEGATIVE

## 2022-05-27 ENCOUNTER — TELEPHONE (OUTPATIENT)
Dept: FAMILY MEDICINE | Facility: CLINIC | Age: 22
End: 2022-05-27
Payer: COMMERCIAL

## 2022-05-27 NOTE — LETTER
Vero 3, 2022      Sydney Mcintosh  [address]  [place] [address]        Dear Ms.Figueroaslorena,    We are writing to inform you of your test results.    We have tried to reach you on multiple occasions regarding your test results, all came back normal.     Resulted Orders   Wet prep - Clinic Collect   Result Value Ref [facility]    Trichomonas Absent Absent    Yeast Present (A) Absent    Clue Cells Present (A) Absent    WBCs/high power field 1+ (A) None   Chlamydia & Gonorrhea by PCR, [facility] - Clinic Collect   Result Value Ref [facility]    Chlamydia Trachomatis Negative Negative      Comment:      Negative for C. trachomatis rRNA by transcription mediated amplification.   A negative result by transcription mediated amplification does not preclude the presence of infection because results are dependent on proper and adequate collection, absence of inhibitors and sufficient rRNA to be detected.    Neisseria gonorrhoeae Negative Negative      Comment:      Negative for N. gonorrhoeae rRNA by transcription mediated amplification. A negative result by transcription mediated amplification does not preclude the presence of C. trachomatis infection because results are dependent on proper and adequate collection, absence of inhibitors and sufficient rRNA to be detected.   HCG qualitative urine   Result Value Ref [facility]    hCG Urine Qualitative Negative Negative      Comment:      This test is for screening purposes.  Results should be interpreted along with the clinical picture.  Confirmation testing is available if warranted by ordering [id number], HCG Quantitative Pregnancy.       If you have any questions or concerns, please call the clinic at the number listed above.       Sincerely,          Dr. Olvera

## 2022-05-27 NOTE — TELEPHONE ENCOUNTER
----- Message from Nahid Garcia MD sent at 5/27/2022  2:38 PM CDT -----  Please call. The rest of her tests were normal, no chlamydia or gonorrhea.

## 2022-07-17 ENCOUNTER — HEALTH MAINTENANCE LETTER (OUTPATIENT)
Age: 22
End: 2022-07-17

## 2022-08-09 ENCOUNTER — VIRTUAL VISIT (OUTPATIENT)
Dept: FAMILY MEDICINE | Facility: CLINIC | Age: 22
End: 2022-08-09
Payer: COMMERCIAL

## 2022-08-09 ENCOUNTER — LAB (OUTPATIENT)
Dept: LAB | Facility: CLINIC | Age: 22
End: 2022-08-09

## 2022-08-09 DIAGNOSIS — N94.9 VAGINAL SYMPTOM: ICD-10-CM

## 2022-08-09 DIAGNOSIS — R39.9 UTI SYMPTOMS: Primary | ICD-10-CM

## 2022-08-09 DIAGNOSIS — R39.9 UTI SYMPTOMS: ICD-10-CM

## 2022-08-09 LAB
ALBUMIN UR-MCNC: 30 MG/DL
APPEARANCE UR: CLEAR
BACTERIA #/AREA URNS HPF: ABNORMAL /HPF
BILIRUB UR QL STRIP: ABNORMAL
CLUE CELLS: ABNORMAL
COLOR UR AUTO: YELLOW
GLUCOSE UR STRIP-MCNC: NEGATIVE MG/DL
HCG UR QL: NEGATIVE
HGB UR QL STRIP: NEGATIVE
KETONES UR STRIP-MCNC: ABNORMAL MG/DL
LEUKOCYTE ESTERASE UR QL STRIP: NEGATIVE
MUCOUS THREADS #/AREA URNS LPF: PRESENT /LPF
NITRATE UR QL: NEGATIVE
PH UR STRIP: 5.5 [PH] (ref 5–8)
RBC #/AREA URNS AUTO: ABNORMAL /HPF
SP GR UR STRIP: >=1.03 (ref 1–1.03)
SQUAMOUS #/AREA URNS AUTO: ABNORMAL /LPF
TRICHOMONAS, WET PREP: ABNORMAL
UROBILINOGEN UR STRIP-ACNC: 0.2 E.U./DL
WBC #/AREA URNS AUTO: ABNORMAL /HPF
WBC'S/HIGH POWER FIELD, WET PREP: ABNORMAL
YEAST, WET PREP: ABNORMAL

## 2022-08-09 PROCEDURE — 87210 SMEAR WET MOUNT SALINE/INK: CPT

## 2022-08-09 PROCEDURE — 99213 OFFICE O/P EST LOW 20 MIN: CPT | Mod: 95 | Performed by: NURSE PRACTITIONER

## 2022-08-09 PROCEDURE — 81001 URINALYSIS AUTO W/SCOPE: CPT

## 2022-08-09 PROCEDURE — 81025 URINE PREGNANCY TEST: CPT

## 2022-08-09 NOTE — PROGRESS NOTES
Sydney is a 22 year old who is being evaluated via a billable video visit.      How would you like to obtain your AVS? MyChart  If the video visit is dropped, the invitation should be resent by: Text to cell phone: [phone]  Will anyone else be joining your video visit? No          Assessment & Plan     UTI symptoms  Patient having on and off urinary symptoms including frequency, urgency and burning with urination.  Also having vaginal symptoms as below.  Has had a recent bacterial and yeast infection in May.  Recommend patient schedule a lab only appointment today at her nearest clinic for a urinalysis and urine pregnancy as well as wet prep below.  Will call patient with results and further recommendations.  - UA reflex to Microscopic - lab collect; Future  - HCG Qual, Urine ([id number]); Future    Vaginal symptom  Patient having recurring vaginal symptoms including discharge, itching and burning.  Does not bacterial and yeast infection as above.  Recommendations for lab as above.  - Wet prep - lab collect; Future  - HCG Qual, Urine ([id number]); Future             See Patient Instructions    Return in about 1 week (around 8/16/2022), or if symptoms worsen or fail to improve.    Vashti Wilder, DNP, APRN-CNP   [facility]    Subjective   Sydney is a 22 year old, presenting for the following health issues:  Vaginal Problem      HPI     Vaginal Symptoms  Onset/Duration: 3-4 weeks   Description:  Vaginal Discharge: white clear creamy   Itching (Pruritis): YES  Burning sensation:  YES- with urination  Odor: No  Accompanying Signs & Symptoms:  Urinary symptoms: YES- burning with urination, frequent urination  Abdominal pain: YES  Fever: No  History:   Sexually active: YES  New Partner: No  Possibility of Pregnancy:  Unsure-patient did have a negative pregnancy test 7/25/2022  Recent antibiotic use: YES- for BV  Previous vaginitis issues: YES- BV  Precipitating or alleviating factors: has had  blood clots, cramping and heavy menses. Last injection she go was late  Therapies tried and outcome: hot baths, Midol    More so the yeast     Review of Systems   Constitutional, HEENT, cardiovascular, pulmonary, gi and gu systems are negative, except as otherwise noted.      Objective           Vitals:  No vitals were obtained today due to virtual visit.    Physical Exam   GENERAL: Healthy, alert and no distress  EYES: Eyes grossly normal to inspection.  No discharge or erythema, or obvious scleral/conjunctival abnormalities.  RESP: No audible wheeze, cough, or visible cyanosis.  No visible retractions or increased work of breathing.    SKIN: Visible skin clear. No significant rash, abnormal pigmentation or lesions.  NEURO: Cranial nerves grossly intact.  Mentation and speech appropriate for age.  PSYCH: Mentation appears normal, affect normal/bright, judgement and insight intact, normal speech and appearance well-groomed.    Diagnostic Test Results:  Pending            Video-Visit Details    Video Start Time: 3:39 PM    Type of service:  Video Visit    Video End Time:3:44 PM    Originating Location (pt. Location): Home    Distant Location (provider location):  [facility]     Platform used for Video Visit: [employer]wedge    .  ..     Chart documentation with Dragon Voice recognition Software. Although reviewed after completion, some words and grammatical errors may remain.

## 2022-08-09 NOTE — PATIENT INSTRUCTIONS
UTI symptoms  Patient having on and off urinary symptoms including frequency, urgency and burning with urination.  Also having vaginal symptoms as below.  Has had a recent bacterial and yeast infection in May.  Recommend patient schedule a lab only appointment today at her nearest clinic for a urinalysis and urine pregnancy as well as wet prep below.  Will call patient with results and further recommendations.  - UA reflex to Microscopic - lab collect; Future  - HCG Qual, Urine ([id number]); Future    Vaginal symptom  Patient having recurring vaginal symptoms including discharge, itching and burning.  Does not bacterial and yeast infection as above.  Recommendations for lab as above.  - Wet prep - lab collect; Future  - HCG Qual, Urine ([id number]); Future

## 2022-08-18 ENCOUNTER — ALLIED HEALTH/NURSE VISIT (OUTPATIENT)
Dept: FAMILY MEDICINE | Facility: CLINIC | Age: 22
End: 2022-08-18
Payer: COMMERCIAL

## 2022-08-18 DIAGNOSIS — Z30.42 ENCOUNTER FOR SURVEILLANCE OF INJECTABLE CONTRACEPTIVE: Primary | ICD-10-CM

## 2022-08-18 PROCEDURE — 99207 PR NO CHARGE NURSE ONLY: CPT

## 2022-09-02 ENCOUNTER — NURSE TRIAGE (OUTPATIENT)
Dept: NURSING | Facility: CLINIC | Age: 22
End: 2022-09-02

## 2022-09-02 NOTE — TELEPHONE ENCOUNTER
Thinks she has a sinus infection  9/10 pain in sinuses.    Per protocol, I instructed she be seen now in clinic. Go to /[facility] if no appointments.  Caller stated understanding and agreement.    Wants to first try for a virtual telephone or video appointment.  Transferred to scheduling.      Additional Information    Negative: Sounds like a life-threatening emergency to the triager    Negative: Difficulty breathing, and not from stuffy nose (e.g., not relieved by cleaning out the nose)    Negative: SEVERE headache and has fever    Negative: Patient sounds very sick or weak to the triager    SEVERE sinus pain    Protocols used: SINUS PAIN AND CONGESTION-A-OH

## 2022-09-02 NOTE — TELEPHONE ENCOUNTER
RN spoke to pt regarding Nurse Triage message below. Per pt, she did schedule a virutal appt with another provider, but it is still ways out.    RN did share with pt that there are no in-clinic appt available today. Pt verbalizes understanding and will go to the [facility] today as her virtual appt is not until 9/06.    Routing to PCP as FYI.    No further action needed.    OSKAR StubbsN, RN   [facility]

## 2022-09-07 ENCOUNTER — VIRTUAL VISIT (OUTPATIENT)
Dept: FAMILY MEDICINE | Facility: CLINIC | Age: 22
End: 2022-09-07
Payer: COMMERCIAL

## 2022-09-07 DIAGNOSIS — J01.90 ACUTE NON-RECURRENT SINUSITIS, UNSPECIFIED LOCATION: Primary | ICD-10-CM

## 2022-09-07 PROCEDURE — 99213 OFFICE O/P EST LOW 20 MIN: CPT | Mod: 95 | Performed by: NURSE PRACTITIONER

## 2022-09-07 RX ORDER — PREDNISONE 20 MG/1
40 TABLET ORAL DAILY
Qty: 10 TABLET | Refills: 0 | Status: SHIPPED | OUTPATIENT
Start: 2022-09-07 | End: 2024-08-28

## 2022-09-07 RX ORDER — ECHINACEA PURPUREA EXTRACT 125 MG
1 TABLET ORAL DAILY PRN
COMMUNITY
End: 2024-08-28

## 2022-09-07 RX ORDER — IBUPROFEN 200 MG
200 TABLET ORAL EVERY 4 HOURS PRN
COMMUNITY

## 2022-09-07 RX ORDER — ACETAMINOPHEN 325 MG/1
325-650 TABLET ORAL EVERY 6 HOURS PRN
COMMUNITY

## 2022-09-07 NOTE — PROGRESS NOTES
"  Sydney is a 22 year old who is being evaluated via a billable telephone visit    What phone number would you like to be contacted at? [phone]  How would you like to obtain your AVS? MyChart    Assessment & Plan   Acute non-recurrent sinusitis, unspecified location  -prescribed doxy for dog bite, has had one dose.  - Instructed to take 40 mg prednisone by mouth daily x 5 days  - Recommended OTC fluticasone nasal spray  - Encouraged continued use of neti pot  - Pt requesting stronger pain medications - advised pt to follow up in clinic for evaluation of dog bite and need for additional pain management  - Of note, pt tearful throughout entire E visit.       Follow up in 1 week for continued or worsening symptoms.    Subjective   Sydney is a 22 year old, presenting for the following health issues:  Sinus Problem      HPI   Pt with \"sinus problems\" x 1 week. Reports congestion, migraines, bilateral ear pressure, and developing sore throat. No fevers. Seen in ED yesterday for dog bite - prescribed doxycycline and reports she started taking this last night. Fully vaccinated and boosted for COVID. No sick contacts. Has tried Afrin spray and Zicam severe congestion spray without relief. Taking tylenol and ibuprofen for dog bite pain. Reports pain has worsened since she has been having to blow her nose and she feels she needs something stronger for pain.     Acute Illness  Acute illness concerns: URI  Onset/Duration: over 7 days  Symptoms:  Fever: no  Chills/Sweats: YES  Headache (location?): YES  Sinus Pressure: YES  Conjunctivitis:  No  Ear Pain: YES: bilateral  Rhinorrhea: No  Congestion: YES  Sore Throat: YES  Cough: YES - mildly  Wheeze: No  Decreased Appetite: YES  Nausea: YES  Vomiting: No  Diarrhea: No  Dysuria/Freq.: No  Dysuria or Hematuria: No  Fatigue/Achiness: YES  Sick/Strep Exposure: No  Therapies tried and outcome: Mucinex, saline nose spray, tylenol and ibuprofen - nothing seems to be " helping.    Review of Systems   Constitutional, HEENT, cardiovascular, pulmonary, gi and gu systems are negative, except as otherwise noted.      Objective         Vitals:  No vitals were obtained today due to virtual visit.    Physical Exam   Crying  PSYCH: Alert and oriented times 3; coherent speech, normal   rate and volume, able to articulate logical thoughts, able   to abstract reason, no tangential thoughts, no hallucinations   or delusions  Her affect is tearful  RESP: Able to talk in full sentences  Remainder of exam unable to be completed due to telephone visits          Phone call duration: 15 minutes      Karin Barahona DNP-FNP Student

## 2022-09-24 ENCOUNTER — HEALTH MAINTENANCE LETTER (OUTPATIENT)
Age: 22
End: 2022-09-24

## 2023-02-27 ENCOUNTER — DOCUMENTATION ONLY (OUTPATIENT)
Dept: LAB | Facility: CLINIC | Age: 23
End: 2023-02-27
Payer: COMMERCIAL

## 2023-02-27 ENCOUNTER — OFFICE VISIT (OUTPATIENT)
Dept: FAMILY MEDICINE | Facility: CLINIC | Age: 23
End: 2023-02-27
Payer: COMMERCIAL

## 2023-02-27 VITALS
WEIGHT: 142 LBS | HEART RATE: 128 BPM | DIASTOLIC BLOOD PRESSURE: 78 MMHG | SYSTOLIC BLOOD PRESSURE: 128 MMHG | BODY MASS INDEX: 22.93 KG/M2

## 2023-02-27 DIAGNOSIS — N76.0 BACTERIAL VAGINOSIS: ICD-10-CM

## 2023-02-27 DIAGNOSIS — L70.0 ACNE VULGARIS: ICD-10-CM

## 2023-02-27 DIAGNOSIS — N92.6 IRREGULAR MENSTRUAL BLEEDING: ICD-10-CM

## 2023-02-27 DIAGNOSIS — B96.89 BACTERIAL VAGINOSIS: ICD-10-CM

## 2023-02-27 DIAGNOSIS — R87.810 CERVICAL HIGH RISK HPV (HUMAN PAPILLOMAVIRUS) TEST POSITIVE: ICD-10-CM

## 2023-02-27 DIAGNOSIS — Z11.3 SCREEN FOR STD (SEXUALLY TRANSMITTED DISEASE): ICD-10-CM

## 2023-02-27 DIAGNOSIS — Z30.42 ENCOUNTER FOR MANAGEMENT AND INJECTION OF DEPO-PROVERA: Primary | ICD-10-CM

## 2023-02-27 LAB
CLUE CELLS: PRESENT
ERYTHROCYTE [DISTWIDTH] IN BLOOD BY AUTOMATED COUNT: 12.2 % (ref 10–15)
FERRITIN SERPL-MCNC: 48 NG/ML (ref 6–175)
HCG UR QL: NEGATIVE
HCT VFR BLD AUTO: 39.1 % (ref 35–47)
HGB BLD-MCNC: 13.1 G/DL (ref 11.7–15.7)
MCH RBC QN AUTO: 30.1 PG (ref 26.5–33)
MCHC RBC AUTO-ENTMCNC: 33.5 G/DL (ref 31.5–36.5)
MCV RBC AUTO: 90 FL (ref 78–100)
PLATELET # BLD AUTO: 338 10E3/UL (ref 150–450)
RBC # BLD AUTO: 4.35 10E6/UL (ref 3.8–5.2)
TRICHOMONAS, WET PREP: ABNORMAL
WBC # BLD AUTO: 9.8 10E3/UL (ref 4–11)
WBC'S/HIGH POWER FIELD, WET PREP: ABNORMAL
YEAST, WET PREP: ABNORMAL

## 2023-02-27 PROCEDURE — 87210 SMEAR WET MOUNT SALINE/INK: CPT | Performed by: FAMILY MEDICINE

## 2023-02-27 PROCEDURE — 86780 TREPONEMA PALLIDUM: CPT | Performed by: FAMILY MEDICINE

## 2023-02-27 PROCEDURE — 96372 THER/PROPH/DIAG INJ SC/IM: CPT | Performed by: FAMILY MEDICINE

## 2023-02-27 PROCEDURE — 87389 HIV-1 AG W/HIV-1&-2 AB AG IA: CPT | Performed by: FAMILY MEDICINE

## 2023-02-27 PROCEDURE — G0145 SCR C/V CYTO,THINLAYER,RESCR: HCPCS | Performed by: FAMILY MEDICINE

## 2023-02-27 PROCEDURE — 85027 COMPLETE CBC AUTOMATED: CPT | Performed by: FAMILY MEDICINE

## 2023-02-27 PROCEDURE — 87491 CHLMYD TRACH DNA AMP PROBE: CPT | Performed by: FAMILY MEDICINE

## 2023-02-27 PROCEDURE — 82728 ASSAY OF FERRITIN: CPT | Performed by: FAMILY MEDICINE

## 2023-02-27 PROCEDURE — 99214 OFFICE O/P EST MOD 30 MIN: CPT | Mod: 25 | Performed by: FAMILY MEDICINE

## 2023-02-27 PROCEDURE — 81025 URINE PREGNANCY TEST: CPT | Performed by: FAMILY MEDICINE

## 2023-02-27 PROCEDURE — 86803 HEPATITIS C AB TEST: CPT | Performed by: FAMILY MEDICINE

## 2023-02-27 PROCEDURE — 87591 N.GONORRHOEAE DNA AMP PROB: CPT | Performed by: FAMILY MEDICINE

## 2023-02-27 PROCEDURE — 36415 COLL VENOUS BLD VENIPUNCTURE: CPT | Performed by: FAMILY MEDICINE

## 2023-02-27 RX ORDER — ONDANSETRON 4 MG/1
4 TABLET, FILM COATED ORAL EVERY 8 HOURS PRN
Qty: 10 TABLET | Refills: 0 | Status: SHIPPED | OUTPATIENT
Start: 2023-02-27 | End: 2024-08-28

## 2023-02-27 RX ORDER — CLINDAMYCIN PHOSPHATE 11.9 MG/ML
SOLUTION TOPICAL EVERY MORNING
Qty: 60 ML | Refills: 4 | Status: SHIPPED | OUTPATIENT
Start: 2023-02-27

## 2023-02-27 RX ORDER — MEDROXYPROGESTERONE ACETATE 150 MG/ML
150 INJECTION, SUSPENSION INTRAMUSCULAR
Status: ACTIVE | OUTPATIENT
Start: 2023-02-27 | End: 2024-02-22

## 2023-02-27 RX ORDER — ADAPALENE 0.1 G/100G
CREAM TOPICAL AT BEDTIME
Qty: 45 G | Refills: 3 | Status: SHIPPED | OUTPATIENT
Start: 2023-02-27

## 2023-02-27 RX ADMIN — MEDROXYPROGESTERONE ACETATE 150 MG: 150 INJECTION, SUSPENSION INTRAMUSCULAR at 16:33

## 2023-02-27 NOTE — TELEPHONE ENCOUNTER
The pt cancelled her last MA appt because she was told she needed to see a provider in order to restart her Depo since she is way out of her range. The pt did not show for the lab appt today either. If the pt calls to schedule, it needs to be WITH A PROVIDER not a MA only or lab appt.

## 2023-02-27 NOTE — PROGRESS NOTES
Sydney Mcintosh has an upcoming lab appointment:    Future Appointments   Date Time Provider Department Center   2/27/2023  9:00 AM [facility]     Patient is scheduled for the following lab(s): My birth control/ depo shot    There is no order available. Please review and place either future orders or HMPO (Review of Health Maintenance Protocol Orders), as appropriate.    There are no preventive care reminders to display for this patient.  Fran Luciano

## 2023-02-27 NOTE — PROGRESS NOTES
Assessment & Plan     Encounter for management and injection of depo-Provera  Patient wishes to return to Depo-Provera.  We will do this for her today.  We did discuss Nexplanon.  She will consider that.  - medroxyPROGESTERone (DEPO-PROVERA) injection 150 mg    Screen for STD (sexually transmitted disease)  Screening as requested.  - Chlamydia & Gonorrhea by PCR, GICH/Range - Clinic Collect  - Wet prep - Clinic Collect  - Treponema Abs w Reflex to RPR and Titer  - HIV Antigen Antibody Combo  - Hepatitis C Screen Reflex to HCV RNA Quant and Genotype    Cervical high risk HPV (human papillomavirus) test positive  Since she was here for STD screening we elected to repeat the Pap and she may have HPV exposures.  - Gynecologic Cytology (PAP Smear)    Irregular menstrual bleeding  Hemoglobin appears replete   - CBC with platelets  - Ferritin  - ondansetron (ZOFRAN) 4 MG tablet  Dispense: 10 tablet; Refill: 0    Acne vulgaris  Discussed management options.  Depo-Provera may influence this somewhat.  She will use a keratolytic and antibacterial to start.  She would like to see dermatology.  Referral provided.  - Adult Dermatology Referral  - adapalene (DIFFERIN) 0.1 % external cream  Dispense: 45 g; Refill: 3  - clindamycin (CLEOCIN T) 1 % external solution  Dispense: 60 mL; Refill: 4     No follow-ups on file.    Jadon Patton MD  [facility]    Van Grimes is a 22 year old accompanied by her self, presenting for the following health issues:  STD      STD    History of Present Illness       Reason for visit:  STD check and depo shot/birth control    She eats 2-3 servings of fruits and vegetables daily.She consumes 4 sweetened beverage(s) daily.She exercises with enough effort to increase her heart rate 20 to 29 minutes per day.  She exercises with enough effort to increase her heart rate 3 or less days per week.   She is taking medications regularly.     Patient presents 5 months late  for her Depo-Provera injection.  She is concerned about STDs.  She has not been sexually active for over a month.  She is currently menstruating some.  Periods are not normal when she is off Depo-Provera.  She has amenorrhea when she has Depo-Provera.  She has not been sexually active because her partner has disclosed other partners.  She wishes to have STD screening.  She has a bit of concern about some discharge.  Has history of yeast infections and would like to be checked for that.  Pap smear done 2 years ago.  Problem list includes cervical high risk HPV test positive.    Patient also with concerns about acne.  This is facial and neck back and chest.  She has never had trouble with back acne in the past.  She is not sure if this has started after tapering Depo.  She reports history of chlamydia.    Menstruation is fairly heavy when she has it.  She has abdominal cramps with it.  They cause her to be nauseous.  She request some ondansetron that she has used for this previously.  She is concerned that she has endometriosis like her mother.  States she is needing to change a pad every 30 minutes.      Objective    /78 (BP Location: Left arm, Patient Position: Sitting, Cuff Size: Adult Regular)   Pulse (!) 128   Wt 64.4 kg (142 lb)   LMP 02/26/2023   BMI 22.93 kg/m    Body mass index is 22.93 kg/m .  Physical Exam   GENERAL: alert, not distressed  CHEST: clear, no rales, rhonchi, or wheezes  CARDIAC: regular without murmur, gallop, or rub  ABDOMEN: soft, non tender, non distended, normal bowel sounds  : Normal labia, vaginal mucosa.  Mild bleeding from the cervical os.  SKIN: Mixed acne face neck chest.    Results for orders placed or performed in visit on 02/27/23 (from the past 24 hour(s))   HCG qualitative urine   Result Value Ref Range    hCG Urine Qualitative Negative Negative   CBC with platelets   Result Value Ref Range    WBC Count 9.8 4.0 - 11.0 10e3/uL    RBC Count 4.35 3.80 - 5.20 10e6/uL     Hemoglobin 13.1 11.7 - 15.7 g/dL    Hematocrit 39.1 35.0 - 47.0 %    MCV 90 78 - 100 fL    MCH 30.1 26.5 - 33.0 pg    MCHC 33.5 31.5 - 36.5 g/dL    RDW 12.2 10.0 - 15.0 %    Platelet Count 338 150 - 450 10e3/uL   Wet prep - Clinic Collect    Specimen: Vagina; Swab   Result Value Ref Range    Trichomonas Absent Absent    Yeast Absent Absent    Clue Cells Present (A) Absent    WBCs/high power field 1+ (A) None

## 2023-02-27 NOTE — PROGRESS NOTES
This does not look like a lab appointment, it looks like she is due for Depo.  I can't tell when she last got a shot - ?May 2022?  If so, then needs to follow Depo protocol - pregnancy test, etc.    If she is up to date, then okay to give Depo Provera.

## 2023-02-28 LAB
C TRACH DNA SPEC QL PROBE+SIG AMP: NEGATIVE
HCV AB SERPL QL IA: NONREACTIVE
HIV 1+2 AB+HIV1 P24 AG SERPL QL IA: NONREACTIVE
N GONORRHOEA DNA SPEC QL NAA+PROBE: NEGATIVE
T PALLIDUM AB SER QL: NONREACTIVE

## 2023-02-28 RX ORDER — METRONIDAZOLE 500 MG/1
500 TABLET ORAL 2 TIMES DAILY
Qty: 14 TABLET | Refills: 0 | Status: SHIPPED | OUTPATIENT
Start: 2023-02-28 | End: 2023-03-07

## 2023-03-01 ENCOUNTER — TELEPHONE (OUTPATIENT)
Dept: FAMILY MEDICINE | Facility: CLINIC | Age: 23
End: 2023-03-01
Payer: COMMERCIAL

## 2023-03-01 LAB
BKR LAB AP GYN ADEQUACY: NORMAL
BKR LAB AP GYN INTERPRETATION: NORMAL
BKR LAB AP HPV REFLEX: NORMAL
BKR LAB AP LMP: NORMAL
BKR LAB AP PREVIOUS ABNORMAL: NORMAL
PATH REPORT.COMMENTS IMP SPEC: NORMAL
PATH REPORT.COMMENTS IMP SPEC: NORMAL
PATH REPORT.RELEVANT HX SPEC: NORMAL

## 2023-03-01 NOTE — TELEPHONE ENCOUNTER
----- Message from Jadon Patton MD sent at 2/28/2023  9:29 PM CST -----  Call:  Yeast not present but a bacterial overgrowth called bacterial vaginosis is detected (clue cells present).  Other tests look acceptable.  I will send a medication (metronidazole) to your pharmacy to treat bacterial vaginosis.  This is not a sexually transmitted infection.

## 2023-03-02 PROBLEM — R87.810 CERVICAL HIGH RISK HPV (HUMAN PAPILLOMAVIRUS) TEST POSITIVE: Status: ACTIVE | Noted: 2020-12-22

## 2023-04-29 ENCOUNTER — HOSPITAL ENCOUNTER (EMERGENCY)
Facility: HOSPITAL | Age: 23
Discharge: HOME | End: 2023-04-29
Attending: EMERGENCY MEDICINE | Admitting: EMERGENCY MEDICINE
Payer: COMMERCIAL

## 2023-04-29 ENCOUNTER — APPOINTMENT (OUTPATIENT)
Dept: CT IMAGING | Facility: HOSPITAL | Age: 23
End: 2023-04-29
Attending: EMERGENCY MEDICINE
Payer: COMMERCIAL

## 2023-04-29 VITALS
RESPIRATION RATE: 16 BRPM | OXYGEN SATURATION: 96 % | HEART RATE: 98 BPM | HEIGHT: 63 IN | WEIGHT: 131.2 LBS | SYSTOLIC BLOOD PRESSURE: 115 MMHG | TEMPERATURE: 97.1 F | BODY MASS INDEX: 23.25 KG/M2 | DIASTOLIC BLOOD PRESSURE: 58 MMHG

## 2023-04-29 DIAGNOSIS — N73.0 PID (ACUTE PELVIC INFLAMMATORY DISEASE): ICD-10-CM

## 2023-04-29 DIAGNOSIS — R51.9 ACUTE NONINTRACTABLE HEADACHE, UNSPECIFIED HEADACHE TYPE: ICD-10-CM

## 2023-04-29 LAB
ALBUMIN SERPL BCG-MCNC: 4.2 G/DL (ref 3.5–5.2)
ALP SERPL-CCNC: 93 U/L (ref 35–104)
ALT SERPL W P-5'-P-CCNC: 16 U/L (ref 10–35)
ANION GAP SERPL CALCULATED.3IONS-SCNC: 11 MMOL/L (ref 7–15)
AST SERPL W P-5'-P-CCNC: 19 U/L (ref 10–35)
BASOPHILS # BLD AUTO: 0.1 10E3/UL (ref 0–0.2)
BASOPHILS NFR BLD AUTO: 1 %
BILIRUB SERPL-MCNC: 0.3 MG/DL
BUN SERPL-MCNC: 5.1 MG/DL (ref 6–20)
CALCIUM SERPL-MCNC: 9.3 MG/DL (ref 8.6–10)
CHLORIDE SERPL-SCNC: 99 MMOL/L (ref 98–107)
CREAT SERPL-MCNC: 0.47 MG/DL (ref 0.51–0.95)
DEPRECATED HCO3 PLAS-SCNC: 26 MMOL/L (ref 22–29)
EOSINOPHIL # BLD AUTO: 0.3 10E3/UL (ref 0–0.7)
EOSINOPHIL NFR BLD AUTO: 4 %
ERYTHROCYTE [DISTWIDTH] IN BLOOD BY AUTOMATED COUNT: 11.6 % (ref 10–15)
GFR SERPL CREATININE-BSD FRML MDRD: >90 ML/MIN/1.73M2
GLUCOSE SERPL-MCNC: 105 MG/DL (ref 70–99)
HCT VFR BLD AUTO: 37.5 % (ref 35–47)
HGB BLD-MCNC: 12.4 G/DL (ref 11.7–15.7)
IMM GRANULOCYTES # BLD: 0 10E3/UL
IMM GRANULOCYTES NFR BLD: 0 %
LYMPHOCYTES # BLD AUTO: 4.1 10E3/UL (ref 0.8–5.3)
LYMPHOCYTES NFR BLD AUTO: 48 %
MCH RBC QN AUTO: 28.5 PG (ref 26.5–33)
MCHC RBC AUTO-ENTMCNC: 33.1 G/DL (ref 31.5–36.5)
MCV RBC AUTO: 86 FL (ref 78–100)
MONOCYTES # BLD AUTO: 0.4 10E3/UL (ref 0–1.3)
MONOCYTES NFR BLD AUTO: 5 %
NEUTROPHILS # BLD AUTO: 3.6 10E3/UL (ref 1.6–8.3)
NEUTROPHILS NFR BLD AUTO: 42 %
NRBC # BLD AUTO: 0 10E3/UL
NRBC BLD AUTO-RTO: 0 /100
PLATELET # BLD AUTO: 391 10E3/UL (ref 150–450)
POTASSIUM SERPL-SCNC: 3.7 MMOL/L (ref 3.4–5.3)
PROT SERPL-MCNC: 7.7 G/DL (ref 6.4–8.3)
RBC # BLD AUTO: 4.35 10E6/UL (ref 3.8–5.2)
SODIUM SERPL-SCNC: 136 MMOL/L (ref 136–145)
WBC # BLD AUTO: 8.4 10E3/UL (ref 4–11)

## 2023-04-29 PROCEDURE — 70450 CT HEAD/BRAIN W/O DYE: CPT

## 2023-04-29 PROCEDURE — 96375 TX/PRO/DX INJ NEW DRUG ADDON: CPT

## 2023-04-29 PROCEDURE — 36415 COLL VENOUS BLD VENIPUNCTURE: CPT | Performed by: EMERGENCY MEDICINE

## 2023-04-29 PROCEDURE — 87389 HIV-1 AG W/HIV-1&-2 AB AG IA: CPT | Performed by: EMERGENCY MEDICINE

## 2023-04-29 PROCEDURE — 80053 COMPREHEN METABOLIC PANEL: CPT | Performed by: EMERGENCY MEDICINE

## 2023-04-29 PROCEDURE — 99285 EMERGENCY DEPT VISIT HI MDM: CPT | Mod: 25

## 2023-04-29 PROCEDURE — 85004 AUTOMATED DIFF WBC COUNT: CPT | Performed by: EMERGENCY MEDICINE

## 2023-04-29 PROCEDURE — 96365 THER/PROPH/DIAG IV INF INIT: CPT

## 2023-04-29 PROCEDURE — 250N000011 HC RX IP 250 OP 636: Performed by: EMERGENCY MEDICINE

## 2023-04-29 PROCEDURE — 86780 TREPONEMA PALLIDUM: CPT | Performed by: EMERGENCY MEDICINE

## 2023-04-29 RX ORDER — METOCLOPRAMIDE 10 MG/1
10 TABLET ORAL 3 TIMES DAILY PRN
Qty: 10 TABLET | Refills: 0 | Status: SHIPPED | OUTPATIENT
Start: 2023-04-29 | End: 2024-08-28

## 2023-04-29 RX ORDER — CEFTRIAXONE 1 G/1
1 INJECTION, POWDER, FOR SOLUTION INTRAMUSCULAR; INTRAVENOUS ONCE
Status: COMPLETED | OUTPATIENT
Start: 2023-04-29 | End: 2023-04-29

## 2023-04-29 RX ORDER — DIPHENHYDRAMINE HYDROCHLORIDE 50 MG/ML
12.5 INJECTION INTRAMUSCULAR; INTRAVENOUS ONCE
Status: COMPLETED | OUTPATIENT
Start: 2023-04-29 | End: 2023-04-29

## 2023-04-29 RX ORDER — KETOROLAC TROMETHAMINE 15 MG/ML
15 INJECTION, SOLUTION INTRAMUSCULAR; INTRAVENOUS ONCE
Status: COMPLETED | OUTPATIENT
Start: 2023-04-29 | End: 2023-04-29

## 2023-04-29 RX ORDER — DOXYCYCLINE 100 MG/1
100 CAPSULE ORAL 2 TIMES DAILY
Qty: 28 CAPSULE | Refills: 0 | Status: SHIPPED | OUTPATIENT
Start: 2023-04-29 | End: 2023-05-13

## 2023-04-29 RX ORDER — METRONIDAZOLE 500 MG/1
500 TABLET ORAL 2 TIMES DAILY
Qty: 28 TABLET | Refills: 0 | Status: SHIPPED | OUTPATIENT
Start: 2023-04-29 | End: 2023-05-13

## 2023-04-29 RX ORDER — METOCLOPRAMIDE HYDROCHLORIDE 5 MG/ML
10 INJECTION INTRAMUSCULAR; INTRAVENOUS ONCE
Status: COMPLETED | OUTPATIENT
Start: 2023-04-29 | End: 2023-04-29

## 2023-04-29 RX ADMIN — CEFTRIAXONE SODIUM 1 G: 1 INJECTION, POWDER, FOR SOLUTION INTRAMUSCULAR; INTRAVENOUS at 19:39

## 2023-04-29 RX ADMIN — DIPHENHYDRAMINE HYDROCHLORIDE 12.5 MG: 50 INJECTION, SOLUTION INTRAMUSCULAR; INTRAVENOUS at 19:22

## 2023-04-29 RX ADMIN — KETOROLAC TROMETHAMINE 15 MG: 15 INJECTION, SOLUTION INTRAMUSCULAR; INTRAVENOUS at 19:28

## 2023-04-29 RX ADMIN — METOCLOPRAMIDE 10 MG: 5 INJECTION, SOLUTION INTRAMUSCULAR; INTRAVENOUS at 19:36

## 2023-04-29 ASSESSMENT — ACTIVITIES OF DAILY LIVING (ADL)
ADLS_ACUITY_SCORE: 33
ADLS_ACUITY_SCORE: 35

## 2023-04-29 NOTE — ED PROVIDER NOTES
"EMERGENCY DEPARTMENT NOTE     Name: Sydney Mcintosh    Age/Sex: 22 year old female   MRN: [id number]   Evaluation Date & Time:  4/29/2023  5:03 PM    PCP:    Jennifer Olvera   ED Provider: Yan Grimes D.O.       CHIEF COMPLAINT    Headache       DIAGNOSIS & DISPOSITION     1. Acute nonintractable headache, unspecified headache type    2. PID (acute pelvic inflammatory disease)      DISPOSITION: Home    At the conclusion of the encounter I discussed the results of all of the tests and the disposition. The questions were answered. The patient or family acknowledged understanding and was agreeable with the care plan.    TOTAL CRITICAL CARE TIME (EXCLUDING PROCEDURES): Not applicable    PROCEDURES:   None    EMERGENCY DEPARTMENT COURSE/MEDICAL DECISION MAKING   6:08 PM I met with the patient to gather history and to perform my initial exam.  We discussed treatment options and the plan for care while in the Emergency Department.  9:35 PM Rechecked and updated patient. I discussed plans for discharge with the patient, which they were agreeable to. We discussed supportive cares at home and reasons for return to the ER including new or worsening symptoms. All questions and concerns were addressed. Patient to be discharged by RN.     Sydney Mcintosh is a 22 year old female with relevant past medical history of recently diagnosed chlamydia infection ED visit at [facility] yesterday on no current therapy, HPV, idiopathic curvature of spine, who presents to the emergency department for evaluation of approximately 1 month worth of dysuria and gradually worsening back pain, as well as approximately 3-4 days worth of a generalized headache that is primary frontal.          Triage note reviewed:  Pt presents with headache and \"pain up my whole spine.\" Pt states she has had headache for 3 to 4 days. Pt was seen at [facility] ED last night for UTI symptoms. Story hard to follow. STD testing done as well due to " "pt complaint of pain when urinating. Pt is moaning and crying in triage. States her \"whole body hurts.\" Mother is present and states that they \"didn't giver her IVS or anything and she is probably dehydrated.\"     Triage Assessment     Row Name 04/29/23 8200       Triage Assessment (Adult)    Airway WDL WDL    Additional Documentation Headache Assessment (Group)       Respiratory WDL    Respiratory WDL WDL       Skin Circulation/Temperature WDL    Skin Circulation/Temperature WDL WDL       Cardiac WDL    Cardiac WDL WDL       Peripheral/Neurovascular WDL    Peripheral Neurovascular WDL WDL       Cognitive/Neuro/Behavioral WDL    Cognitive/Neuro/Behavioral WDL WDL       Headache Assessment    Headache Location generalized    Description/Character constrictive;pressure;sharp;throbbing    Associated Signs/Symptoms photophobia;neck stiffness                Differential  diagnosis  considered included but not limited to PID, appendicitis, obstructing urolithiasis, urinary tract infection, and for headache considered ICH, dural vein thrombosis, ICP or more benign cause including tension type headache or migraine    Diagnostic studies:  Imaging:  Head CT w/o contrast   Final Result   IMPRESSION:   1.  Normal head CT.           Lab:  Labs Ordered and Resulted from Time of ED Arrival to Time of ED Departure   COMPREHENSIVE METABOLIC PANEL - Abnormal       Result Value    Sodium 136      Potassium 3.7      Chloride 99      Carbon Dioxide (CO2) 26      Anion Gap 11      Urea Nitrogen 5.1 (*)     Creatinine 0.47 (*)     Calcium 9.3      Glucose 105 (*)     Alkaline Phosphatase 93      AST 19      ALT 16      Protein Total 7.7      Albumin 4.2      Bilirubin Total 0.3      GFR Estimate >90     CBC WITH PLATELETS AND DIFFERENTIAL    WBC Count 8.4      RBC Count 4.35      Hemoglobin 12.4      Hematocrit 37.5      MCV 86      MCH 28.5      MCHC 33.1      RDW 11.6      Platelet Count 391      % Neutrophils 42      % Lymphocytes 48   " "   % Monocytes 5      % Eosinophils 4      % Basophils 1      % Immature Granulocytes 0      NRBCs per 100 WBC 0      Absolute Neutrophils 3.6      Absolute Lymphocytes 4.1      Absolute Monocytes 0.4      Absolute Eosinophils 0.3      Absolute Basophils 0.1      Absolute Immature Granulocytes 0.0      Absolute NRBCs 0.0     TREPONEMA ABS W REFLEX TO RPR AND TITER   HIV ANTIGEN ANTIBODY COMBO          Interventions: IV ketorolac, diphenhydramine, metoclopramide, ceftriaxone  Discharge Vital Signs:/58   Pulse 98   Temp 97.1  F (36.2  C) (Temporal)   Resp 16   Ht 1.6 m (5' 3\")   Wt 59.5 kg (131 lb 3.2 oz)   LMP 04/28/2023   SpO2 96%   BMI 23.24 kg/m      Medical Decision Making: CT of the head negative for acute process.  Headache is resolved with ketorolac and Reglan and suspect tension type versus migraine and currently low suspicion for other process including cerebral aneurysm or leakage or dural vein thrombosis.  Patient complained of lower abdominal discomfort but denied significant vaginal discharge.  CT abdomen and pelvis from [facility] yesterday was reviewed.  This visualized normal appendix and no evidence of tubo-ovarian abscess, ovarian torsion, obstructing urolithiasis or other process.  Labs were reviewed, HIV was sent but not RPR, gonorrhea was negative but chlamydia positive.  Screening labs with CBC and comprehensive metabolic profile within normal limits.  Patient will be discharged.  We will place the patient on doxycycline and metronidazole for treatment of chlamydia and possible PID.  She is given cautions about avoidance of alcohol while on metronidazole.  She will continue Tylenol ibuprofen with metoclopramide if any recurrent headaches.  Recommendation was follow-up with primary care physician next week.  Return criteria were discussed and if she had recurrent headache not improved with medications, increasing abdominal pain not improved with Tylenol or ibuprofen or " development of new symptoms including fever will return to the emergency department.    History:    Supplemental history from: Documented in chart, if applicable and Family Member/Significant Other    External Record(s) reviewed: Documented in chart, if applicable. and Outpatient Record: ED visit [facility] 4/28/2023 and primary care office visit 2/27/2023    Work Up:    Chart documentation includes differential considered and any EKGs or imaging independently interpreted by provider, where specified.  In additional to work up documented, I considered the following work up: CTA, serially TAVR MRV for headache with low suspicion for subarachnoid aneurysm or leakage, dural vein thrombosis  External consultation:    Discussion of management with another provider: Documented in chart, if applicable    Complicating factors:    Care impacted by chronic illness: N/A    Care affected by social determinants of health: N/A    Disposition considerations: Discharge. I prescribed additional prescription strength medication(s) as charted. N/A.      @@@    ED INTERVENTIONS     Medications   ketorolac (TORADOL) injection 15 mg (15 mg Intravenous $Given 4/29/23 1928)   metoclopramide (REGLAN) injection 10 mg (10 mg Intravenous $Given 4/29/23 1936)   diphenhydrAMINE (BENADRYL) injection 12.5 mg (12.5 mg Intravenous $Given 4/29/23 1922)   cefTRIAXone (ROCEPHIN) 1 g vial to attach to  mL bag for ADULTS or NS 50 mL bag for PEDS (0 g Intravenous Stopped 4/29/23 2013)       DISCHARGE MEDICATIONS        Review of your medicines      UNREVIEWED medicines. Ask your doctor about these medicines      Dose / Directions   acetaminophen 325 MG tablet  Commonly known as: TYLENOL      Dose: 325-650 mg  Take 325-650 mg by mouth every 6 hours as needed for mild pain  Refills: 0     adapalene 0.1 % external cream  Commonly known as: DIFFERIN  Used for: Acne vulgaris      Apply topically At Bedtime Apply thin layer to affected areas of skin  at bedtime  Quantity: 45 g  Refills: 3     ammonium lactate 12 % external lotion  Commonly known as: LAC-HYDRIN      Apply topically as needed  Refills: 0     cetirizine 10 MG tablet  Commonly known as: zyrTEC      Dose: 10 mg  Take 10 mg by mouth as needed  Refills: 0     clindamycin 1 % external solution  Commonly known as: CLEOCIN T  Used for: Acne vulgaris      Apply topically every morning Thin layer to affected areas  Quantity: 60 mL  Refills: 4     haloperidol 1 MG tablet  Commonly known as: HALDOL      Dose: 1 mg  Take 1 tablet (1 mg) by mouth 3 times daily for 7 days  Quantity: 21 tablet  Refills: 0     ibuprofen 200 MG tablet  Commonly known as: ADVIL/MOTRIN      Dose: 200 mg  Take 200 mg by mouth every 4 hours as needed for mild pain  Refills: 0     medroxyPROGESTERone 150 MG/ML IM injection  Commonly known as: DEPO-PROVERA      Dose: 150 mg  Inject 150 mg into the muscle every 3 months  Refills: 0     MUCINEX ALLERGY PO      Refills: 0     omeprazole 20 MG DR capsule  Commonly known as: priLOSEC      as needed  Refills: 0     ondansetron 4 MG tablet  Commonly known as: ZOFRAN  Used for: Screen for STD (sexually transmitted disease)      Dose: 4 mg  Take 1 tablet (4 mg) by mouth every 8 hours as needed for nausea  Quantity: 10 tablet  Refills: 0     predniSONE 20 MG tablet  Commonly known as: DELTASONE  Used for: Acute non-recurrent sinusitis, unspecified location      Dose: 40 mg  Take 2 tablets (40 mg) by mouth daily  Quantity: 10 tablet  Refills: 0     sodium chloride 0.65 % nasal spray  Commonly known as: OCEAN      Dose: 1 spray  Spray 1 spray into both nostrils daily as needed for congestion  Refills: 0     triamcinolone acetonide 0.025 % Lotn      [TRIAMCINOLONE ACETONIDE 0.025 % LOTN] Apply topically.  Refills: 0        START taking      Dose / Directions   doxycycline hyclate 100 MG capsule  Commonly known as: VIBRAMYCIN      Dose: 100 mg  Take 1 capsule (100 mg) by mouth 2 times daily for 14  days  Quantity: 28 capsule  Refills: 0     metroNIDAZOLE 500 MG tablet  Commonly known as: FLAGYL      Dose: 500 mg  Take 1 tablet (500 mg) by mouth 2 times daily for 14 days  Quantity: 28 tablet  Refills: 0        CONTINUE these medicines which may have CHANGED, or have new prescriptions. If we are uncertain of the size of tablets/capsules you have at home, strength may be listed as something that might have changed.      Dose / Directions   metoclopramide 10 MG tablet  Commonly known as: REGLAN  This may have changed:     how much to take    how to take this    when to take this    reasons to take this    additional instructions      Dose: 10 mg  Take 1 tablet (10 mg) by mouth 3 times daily as needed (headache or nausea)  Quantity: 10 tablet  Refills: 0           Where to get your medicines      Some of these will need a paper prescription and others can be bought over the counter. Ask your nurse if you have questions.    Bring a paper prescription for each of these medications    doxycycline hyclate 100 MG capsule    metoclopramide 10 MG tablet    metroNIDAZOLE 500 MG tablet           INFORMATION SOURCE AND LIMITATIONS    History/Exam limitations: None   Patient information was obtained from: Patient   Use of : N/A    HISTORY OF PRESENT ILLNESS   Sydney Mcintosh is a 22 year old year old female with a relevant past medical history of chlamydia infection, HPV, idiopathic curvature of spine, who presents to this ED via walk-in for evaluation of headache, dysuria, and back pain .    Per Chart Review, patient was seen in the [facility] ED earlier today for evaluation of suprapubic abdominal pain and left CVA tenderness. Patient also reported concerns for STD as boyfriend cheated on her. Exam notable for tenderness to palpation in suprapubic abdomen and left flank. She was otherwise well appearing and vitals were reassuring. UA obtained in triage with no signs of infection and with large blood in the  setting of menses. UPT was negative. Wet prep, GC and HIV ordered. Chlamydia Trachomatis STD laboratory resulted positive, all other labs otherwise unremarkable. Ct Abdomen Pelvis conveyed: No acute abnormality. No urinary stone or hydronephrosis. Patient was discharged in stable condition.     Patient endorses the history above and states that she returned to an ED today because at the [facility] her headache was not treated or evaluated, as she did not undergo a head CT, and as the Toradol she was provided at that visit did not provide her any relief of her headache. She also denies undergoing any pelvic exam at the [facility] ED.    Patient reports having approximately one month worth of dysuria, as well as back pain that initially was lower back pain but has gradually become generalized back pain. She also endorses recent generalized lower abdominal pain, but she denies any recent diarrhea. She also denies any recent vaginal discharge or hematuria. She endorses receiving a Depo Provera shot every 3 months, which she notes causes her to have irregular menses (if any at all), however, she endorses starting a menstrual period approximately 2 days ago.     Patient also reports having approximately 3-4 days worth of a progressively worsening headache that is generalized, but primarily frontal. She notes the headache is characterized as heavy pressure and is provoked with bending. She endorses nausea secondary to the headache. She denies any history of headaches. She denies any recent fever, and states her temperature yesterday was 99.7 F. She denies any known recent sick/ill contacts. She denies taking any daily/prescription medications. She denies any recent URI symptoms, sore throat, vomiting, chest pain, shortness of breath, or any other complications at this time.     REVIEW OF SYSTEMS:   Constitutional: Negative for fever.   HENT: Negative for URI symptoms or sore throat.    Cardiac: Negative for  "chest pain, palpitations, near syncope or syncope  Respiratory: Negative for cough and shortness of breath.    Gastrointestinal: Positive for abdominal pain. Positive for nausea (secondary to headache). Negative for vomiting, constipation, diarrhea, rectal bleeding or melena.  Genitourinary: Positive for dysuria. Negative for flank pain and hematuria.   Musculoskeletal: Positive for back pain.   Skin: Negative for rash  Neurological: Positive for abdominal pain. Negative for dizziness, syncope, speech difficulty, unilateral weakness or imbalance with walking.   Hematological: Negative for adenopathy. Does not bruise/bleed easily.   Psychiatric/Behavioral: Negative for confusion.     PATIENT HISTORY     Past Medical History:   Diagnosis Date     Cervical high risk HPV (human papillomavirus) test positive 12/22/2020     Chlamydia infection 05/2018     Patient Active Problem List   Diagnosis     Curvature Of Spine (Acquired) (Idiopathic)     Dysmenorrhea     Contraception management - Depo     Chlamydia     Cervical high risk HPV (human papillomavirus) test positive     Past Surgical History:   Procedure Laterality Date     TONSILLECTOMY  2002       Allergies   Allergen Reactions     Amoxicillin Rash       OUTPATIENT MEDICATIONS     New Prescriptions    DOXYCYCLINE HYCLATE (VIBRAMYCIN) 100 MG CAPSULE    Take 1 capsule (100 mg) by mouth 2 times daily for 14 days    METOCLOPRAMIDE (REGLAN) 10 MG TABLET    Take 1 tablet (10 mg) by mouth 3 times daily as needed (headache or nausea)    METRONIDAZOLE (FLAGYL) 500 MG TABLET    Take 1 tablet (500 mg) by mouth 2 times daily for 14 days      Vitals:    04/29/23 1657 04/29/23 2000 04/29/23 2030   BP: (!) 129/94 116/56 115/58   Pulse: 113 98 98   Resp: 22  16   Temp: 97.1  F (36.2  C)     TempSrc: Temporal     SpO2: 99% 97% 96%   Weight: 59.5 kg (131 lb 3.2 oz)     Height: 1.6 m (5' 3\")         Physical Exam   Constitutional: Oriented to person, place, and time. Appears " well-developed and well-nourished.   HEENT:    Head: Atraumatic.   Neck: Normal range of motion. Neck supple.   Cardiovascular: Normal rate, regular rhythm and normal heart sounds.    Pulmonary/Chest: Normal effort  and breath sounds normal.   Abdominal: Suprapubic abdominal tenderness present. Soft. Bowel sounds are normal.   Musculoskeletal: Normal range of motion.   Neurological: Alert and oriented to person, place, and time. Normal strength. No sensory deficit. No cranial nerve deficit.  Skin: Skin is warm and dry.   Psychiatric: Normal mood and affect. Behavior is normal. Thought content normal.     DIAGNOSTICS    LABORATORY FINDINGS (REVIEWED AND INTERPRETED):  Labs Ordered and Resulted from Time of ED Arrival to Time of ED Departure   COMPREHENSIVE METABOLIC PANEL - Abnormal       Result Value    Sodium 136      Potassium 3.7      Chloride 99      Carbon Dioxide (CO2) 26      Anion Gap 11      Urea Nitrogen 5.1 (*)     Creatinine 0.47 (*)     Calcium 9.3      Glucose 105 (*)     Alkaline Phosphatase 93      AST 19      ALT 16      Protein Total 7.7      Albumin 4.2      Bilirubin Total 0.3      GFR Estimate >90     CBC WITH PLATELETS AND DIFFERENTIAL    WBC Count 8.4      RBC Count 4.35      Hemoglobin 12.4      Hematocrit 37.5      MCV 86      MCH 28.5      MCHC 33.1      RDW 11.6      Platelet Count 391      % Neutrophils 42      % Lymphocytes 48      % Monocytes 5      % Eosinophils 4      % Basophils 1      % Immature Granulocytes 0      NRBCs per 100 WBC 0      Absolute Neutrophils 3.6      Absolute Lymphocytes 4.1      Absolute Monocytes 0.4      Absolute Eosinophils 0.3      Absolute Basophils 0.1      Absolute Immature Granulocytes 0.0      Absolute NRBCs 0.0     TREPONEMA ABS W REFLEX TO RPR AND TITER   HIV ANTIGEN ANTIBODY COMBO         IMAGING (REVIEWED AND INTERPRETED):  Head CT w/o contrast   Final Result   IMPRESSION:   1.  Normal head CT.          I, Jonathan Staples, am serving as a scribe to  document services personally performed by Yan Grimes D.O., based on my observation and the provider s statements to me.    I, Yan Grimes D.O., attest that Jonathan Staples is acting in a scribe capacity, has observed my performance of the services and has documented them in accordance with my direction.    Yan Grimes D.O.  EMERGENCY MEDICINE   04/29/23  [facility] EMERGENCY DEPARTMENT  [address] [place] [id number]  [phone]  Dept: [phone]       Yan Grimes,   05/01/23 0007

## 2023-04-29 NOTE — ED TRIAGE NOTES
"Pt presents with headache and \"pain up my whole spine.\" Pt states she has had headache for 3 to 4 days. Pt was seen at [facility] ED last night for UTI symptoms. Story hard to follow. STD testing done as well due to pt complaint of pain when urinating. Pt is moaning and crying in triage. States her \"whole body hurts.\" Mother is present and states that they \"didn't giver her IVS or anything and she is probably dehydrated.\"     Triage Assessment     Row Name 04/29/23 6468       Triage Assessment (Adult)    Airway WDL WDL    Additional Documentation Headache Assessment (Group)       Respiratory WDL    Respiratory WDL WDL       Skin Circulation/Temperature WDL    Skin Circulation/Temperature WDL WDL       Cardiac WDL    Cardiac WDL WDL       Peripheral/Neurovascular WDL    Peripheral Neurovascular WDL WDL       Cognitive/Neuro/Behavioral WDL    Cognitive/Neuro/Behavioral WDL WDL       Headache Assessment    Headache Location generalized    Description/Character constrictive;pressure;sharp;throbbing    Associated Signs/Symptoms photophobia;neck stiffness              "

## 2023-04-30 LAB — T PALLIDUM AB SER QL: NONREACTIVE

## 2023-04-30 NOTE — ED NOTES
"Pt states she has a headache and body aches. She was \"seen at [facility] last night and they did nothing to help her.\" She has pain of 7/10 all over. She is emotional and tearful after hearing results of STD tests. She states \"my boyfriend of 6 years cheated and this happened 2 years ago as well.\" Pts mother is at bedside.   "

## 2023-04-30 NOTE — DISCHARGE INSTRUCTIONS
Start metronidazole and doxycycline for treatment of pelvic pain and chlamydia.  Avoid alcohol use if you are taking metronidazole.  Take ibuprofen 400 mg every 8 hours and Tylenol 650 m g every 6 hours for pain management.  May use Reglan with Benadryl if recurrent headache not improved with Tylenol or ibuprofen.  See  early next week.  If you have recurrent abdominal pain not improved with Tylenol or ibuprofen, development of fever or vomiting or if headache not improved with pain medications return to the emergency department.

## 2023-05-01 LAB — HIV 1+2 AB+HIV1 P24 AG SERPL QL IA: NONREACTIVE

## 2023-05-02 ENCOUNTER — TELEPHONE (OUTPATIENT)
Dept: FAMILY MEDICINE | Facility: CLINIC | Age: 23
End: 2023-05-02
Payer: COMMERCIAL

## 2023-05-02 DIAGNOSIS — L70.0 ACNE VULGARIS: Primary | ICD-10-CM

## 2023-05-08 NOTE — TELEPHONE ENCOUNTER
Patient needs to use Adapalene Gel or another preferred alternative from list below.  Adapalene cream is not covered.

## 2023-05-11 RX ORDER — ADAPALENE GEL USP, 0.3% 3 MG/G
GEL TOPICAL
Qty: 45 G | Refills: 0 | Status: SHIPPED | OUTPATIENT
Start: 2023-05-11

## 2023-08-01 ENCOUNTER — OFFICE VISIT (OUTPATIENT)
Dept: FAMILY MEDICINE | Facility: CLINIC | Age: 23
End: 2023-08-01
Payer: COMMERCIAL

## 2023-08-01 VITALS
WEIGHT: 123 LBS | OXYGEN SATURATION: 99 % | RESPIRATION RATE: 16 BRPM | TEMPERATURE: 98 F | BODY MASS INDEX: 23.22 KG/M2 | HEIGHT: 61 IN | DIASTOLIC BLOOD PRESSURE: 72 MMHG | SYSTOLIC BLOOD PRESSURE: 113 MMHG | HEART RATE: 125 BPM

## 2023-08-01 DIAGNOSIS — Z30.013 ENCOUNTER FOR INITIAL PRESCRIPTION OF INJECTABLE CONTRACEPTIVE: Primary | ICD-10-CM

## 2023-08-01 DIAGNOSIS — Z11.3 SCREEN FOR STD (SEXUALLY TRANSMITTED DISEASE): ICD-10-CM

## 2023-08-01 DIAGNOSIS — A59.9 TRICHOMONAS VAGINALIS INFECTION: ICD-10-CM

## 2023-08-01 LAB
CLUE CELLS: ABNORMAL
HCG UR QL: NEGATIVE
TRICHOMONAS, WET PREP: PRESENT
WBC'S/HIGH POWER FIELD, WET PREP: ABNORMAL
YEAST, WET PREP: ABNORMAL

## 2023-08-01 PROCEDURE — 87210 SMEAR WET MOUNT SALINE/INK: CPT | Performed by: FAMILY MEDICINE

## 2023-08-01 PROCEDURE — 87591 N.GONORRHOEAE DNA AMP PROB: CPT | Performed by: FAMILY MEDICINE

## 2023-08-01 PROCEDURE — 87491 CHLMYD TRACH DNA AMP PROBE: CPT | Performed by: FAMILY MEDICINE

## 2023-08-01 PROCEDURE — 99213 OFFICE O/P EST LOW 20 MIN: CPT | Performed by: FAMILY MEDICINE

## 2023-08-01 PROCEDURE — 81025 URINE PREGNANCY TEST: CPT | Performed by: FAMILY MEDICINE

## 2023-08-01 RX ORDER — METRONIDAZOLE 500 MG/1
500 TABLET ORAL 2 TIMES DAILY
Qty: 14 TABLET | Refills: 0 | Status: SHIPPED | OUTPATIENT
Start: 2023-08-01 | End: 2023-08-08

## 2023-08-01 NOTE — PROGRESS NOTES
1. Encounter for initial prescription of injectable contraceptive  This is a 22 yo female who is late for her planned Depo Provera injection.  She is adamant that she has not been sexually active for at least 2 weeks.  Today's urine pregnancy test is negative.    - HCG qualitative urine    2. Screen for STD (sexually transmitted disease)  Patient requests STD screen - will collect/send swabs for GC/Chlamydia/wet prep.  Of note, patient is most concerned about Chlamydia - apparently was diagnosed with Chlamydia a few weeks ago - never finished her prescription. -   - Chlamydia trachomatis/Neisseria gonorrhoeae by PCR - Clinic Collect  - Wet prep - Clinic Collect    3. Trichomonas vaginalis infection  Wet prep is positive for Trichomonas.  Will treat with metronidazole - discussed importance of treatment.    - metroNIDAZOLE (FLAGYL) 500 MG tablet; Take 1 tablet (500 mg) by mouth 2 times daily for 7 days  Dispense: 14 tablet; Refill: 0      Subjective   Sydney is a 23 year old, presenting for the following health issues:  std check (Pt is here for STD check and Depo. Pt has left urine)      8/1/2023     5:30 PM   Additional Questions   Roomed by Alona   Accompanied by self       Last intercourse 2 weeks ago   Had period about a week ago -     Wants STD testing -   Little extra discharge -     Worried about Chlamydia -   Didn't take whole prescription - had twice a day for 7 day s- didn't finish         History of Present Illness       Reason for visit:  Depo shot and std check    She eats 2-3 servings of fruits and vegetables daily.She consumes 2 sweetened beverage(s) daily.She exercises with enough effort to increase her heart rate 9 or less minutes per day.  She exercises with enough effort to increase her heart rate 4 days per week.   She is taking medications regularly.       Review of Systems   Constitutional: Negative.  Negative for chills and fever.   HENT: Negative.     Eyes:  Negative for visual  disturbance.   Respiratory:  Negative for cough and shortness of breath.    Cardiovascular:  Negative for chest pain and peripheral edema.   Gastrointestinal: Negative.    Endocrine: Negative for polyuria.   Genitourinary:  Positive for vaginal discharge.   Musculoskeletal: Negative.    Skin: Negative.    Allergic/Immunologic: Negative.    Neurological: Negative.    Hematological:  Does not bruise/bleed easily.   Psychiatric/Behavioral: Negative.     All other systems reviewed and are negative.           Objective    LMP 07/25/2023 (Approximate)   There is no height or weight on file to calculate BMI.  Physical Exam  Vitals reviewed.   Constitutional:       General: She is not in acute distress.     Appearance: Normal appearance.   HENT:      Head: Normocephalic.      Right Ear: Tympanic membrane, ear canal and external ear normal.      Left Ear: Tympanic membrane, ear canal and external ear normal.      Nose: Nose normal.      Mouth/Throat:      Mouth: Mucous membranes are moist.      Pharynx: No posterior oropharyngeal erythema.   Eyes:      Extraocular Movements: Extraocular movements intact.      Conjunctiva/sclera: Conjunctivae normal.      Pupils: Pupils are equal, round, and reactive to light.   Cardiovascular:      Rate and Rhythm: Normal rate and regular rhythm.      Pulses: Normal pulses.      Heart sounds: Normal heart sounds. No murmur heard.  Pulmonary:      Effort: Pulmonary effort is normal.      Breath sounds: Normal breath sounds.   Abdominal:      Palpations: Abdomen is soft. There is no mass.      Tenderness: There is no abdominal tenderness. There is no guarding or rebound.   Genitourinary:     Comments: PELVIC EXAM:External genitalia: normal  Vaginal mucosa normal  Vaginal discharge: white/yellow  Speculum exam shows a normal appearing cervix .   Bimanual exam: Cervix closed, firm, non tender  to motion.   Musculoskeletal:         General: No deformity. Normal range of motion.      Cervical  back: Normal range of motion and neck supple.   Lymphadenopathy:      Cervical: No cervical adenopathy.   Skin:     General: Skin is warm and dry.   Neurological:      General: No focal deficit present.      Mental Status: She is alert.   Psychiatric:         Mood and Affect: Mood normal.         Behavior: Behavior normal.            Results for orders placed or performed in visit on 08/01/23   HCG qualitative urine     Status: Normal   Result Value Ref Range    hCG Urine Qualitative Negative Negative   Chlamydia trachomatis/Neisseria gonorrhoeae by PCR - Clinic Collect     Status: Normal    Specimen: Endocervical/cervical; Swab   Result Value Ref Range    Chlamydia Trachomatis Negative Negative    Neisseria gonorrhoeae Negative Negative   Wet prep - Clinic Collect     Status: Abnormal    Specimen: Vagina; Swab   Result Value Ref Range    Trichomonas Present (A) Absent    Yeast Absent Absent    Clue Cells Absent Absent    WBCs/high power field None None           Prior to immunization administration, verified patients identity using patient s name and date of birth. Please see Immunization Activity for additional information.     Screening Questionnaire for Adult Immunization    Are you sick today?   No   Do you have allergies to medications, food, a vaccine component or latex?   Yes   Have you ever had a serious reaction after receiving a vaccination?   No   Do you have a long-term health problem with heart, lung, kidney, or metabolic disease (e.g., diabetes), asthma, a blood disorder, no spleen, complement component deficiency, a cochlear implant, or a spinal fluid leak?  Are you on long-term aspirin therapy?   No   Do you have cancer, leukemia, HIV/AIDS, or any other immune system problem?   No   Do you have a parent, brother, or sister with an immune system problem?   No   In the past 3 months, have you taken medications that affect  your immune system, such as prednisone, other steroids, or anticancer drugs;  drugs for the treatment of rheumatoid arthritis, Crohn s disease, or psoriasis; or have you had radiation treatments?   No   Have you had a seizure, or a brain or other nervous system problem?   No   During the past year, have you received a transfusion of blood or blood    products, or been given immune (gamma) globulin or antiviral drug?   No   For women: Are you pregnant or is there a chance you could become       pregnant during the next month?   No   Have you received any vaccinations in the past 4 weeks?   No     Immunization questionnaire was positive for at least one answer.  Notified .      Patient instructed to remain in clinic for 15 minutes afterwards, and to report any adverse reactions.     Screening performed by Alona Nathan MA on 8/1/2023 at 5:34 PM.

## 2023-08-02 LAB
C TRACH DNA SPEC QL PROBE+SIG AMP: NEGATIVE
N GONORRHOEA DNA SPEC QL NAA+PROBE: NEGATIVE

## 2023-08-05 ENCOUNTER — HEALTH MAINTENANCE LETTER (OUTPATIENT)
Age: 23
End: 2023-08-05

## 2023-08-06 ASSESSMENT — ENCOUNTER SYMPTOMS
NEUROLOGICAL NEGATIVE: 1
BRUISES/BLEEDS EASILY: 0
GASTROINTESTINAL NEGATIVE: 1
SHORTNESS OF BREATH: 0
PSYCHIATRIC NEGATIVE: 1
ALLERGIC/IMMUNOLOGIC NEGATIVE: 1
FEVER: 0
MUSCULOSKELETAL NEGATIVE: 1
CONSTITUTIONAL NEGATIVE: 1
CHILLS: 0
COUGH: 0

## 2024-03-11 DIAGNOSIS — Z30.42 ENCOUNTER FOR MANAGEMENT AND INJECTION OF DEPO-PROVERA: Primary | ICD-10-CM

## 2024-03-11 RX ORDER — MEDROXYPROGESTERONE ACETATE 150 MG/ML
150 INJECTION, SUSPENSION INTRAMUSCULAR
Status: ACTIVE | OUTPATIENT
Start: 2024-03-11 | End: 2025-03-06

## 2024-08-28 ENCOUNTER — OFFICE VISIT (OUTPATIENT)
Dept: FAMILY MEDICINE | Facility: CLINIC | Age: 24
End: 2024-08-28
Payer: COMMERCIAL

## 2024-08-28 VITALS
HEART RATE: 105 BPM | DIASTOLIC BLOOD PRESSURE: 83 MMHG | OXYGEN SATURATION: 100 % | RESPIRATION RATE: 19 BRPM | SYSTOLIC BLOOD PRESSURE: 127 MMHG | HEIGHT: 61 IN | WEIGHT: 135.8 LBS | BODY MASS INDEX: 25.64 KG/M2 | TEMPERATURE: 98.2 F

## 2024-08-28 DIAGNOSIS — R09.82 POST-NASAL DRIP: ICD-10-CM

## 2024-08-28 DIAGNOSIS — F11.11: ICD-10-CM

## 2024-08-28 DIAGNOSIS — N89.8 VAGINAL DISCHARGE: Primary | ICD-10-CM

## 2024-08-28 DIAGNOSIS — R30.0 DYSURIA: ICD-10-CM

## 2024-08-28 DIAGNOSIS — Z11.3 ROUTINE SCREENING FOR STI (SEXUALLY TRANSMITTED INFECTION): ICD-10-CM

## 2024-08-28 DIAGNOSIS — R11.2 NAUSEA AND VOMITING, UNSPECIFIED VOMITING TYPE: ICD-10-CM

## 2024-08-28 DIAGNOSIS — F41.9 ANXIETY: ICD-10-CM

## 2024-08-28 DIAGNOSIS — N30.01 ACUTE CYSTITIS WITH HEMATURIA: ICD-10-CM

## 2024-08-28 DIAGNOSIS — Z78.9: ICD-10-CM

## 2024-08-28 LAB
ALBUMIN UR-MCNC: 100 MG/DL
APPEARANCE UR: ABNORMAL
BACTERIA #/AREA URNS HPF: ABNORMAL /HPF
BILIRUB UR QL STRIP: NEGATIVE
C TRACH DNA SPEC QL PROBE+SIG AMP: NEGATIVE
CLUE CELLS: ABNORMAL
COLOR UR AUTO: YELLOW
GLUCOSE UR STRIP-MCNC: NEGATIVE MG/DL
HCV AB SERPL QL IA: NONREACTIVE
HGB UR QL STRIP: ABNORMAL
HIV 1+2 AB+HIV1 P24 AG SERPL QL IA: NONREACTIVE
KETONES UR STRIP-MCNC: NEGATIVE MG/DL
LEUKOCYTE ESTERASE UR QL STRIP: ABNORMAL
MUCOUS THREADS #/AREA URNS LPF: PRESENT /LPF
N GONORRHOEA DNA SPEC QL NAA+PROBE: NEGATIVE
NITRATE UR QL: NEGATIVE
PH UR STRIP: 7 [PH] (ref 5–8)
RBC #/AREA URNS AUTO: ABNORMAL /HPF
SP GR UR STRIP: >=1.03 (ref 1–1.03)
SQUAMOUS #/AREA URNS AUTO: ABNORMAL /LPF
TRANS CELLS #/AREA URNS HPF: ABNORMAL /HPF
TRICHOMONAS, WET PREP: ABNORMAL
UROBILINOGEN UR STRIP-ACNC: 0.2 E.U./DL
WBC #/AREA URNS AUTO: ABNORMAL /HPF
WBC CLUMPS #/AREA URNS HPF: PRESENT /HPF
WBC'S/HIGH POWER FIELD, WET PREP: ABNORMAL
YEAST, WET PREP: ABNORMAL

## 2024-08-28 PROCEDURE — 87389 HIV-1 AG W/HIV-1&-2 AB AG IA: CPT | Performed by: NURSE PRACTITIONER

## 2024-08-28 PROCEDURE — 87086 URINE CULTURE/COLONY COUNT: CPT | Performed by: NURSE PRACTITIONER

## 2024-08-28 PROCEDURE — 86803 HEPATITIS C AB TEST: CPT | Performed by: NURSE PRACTITIONER

## 2024-08-28 PROCEDURE — 87591 N.GONORRHOEAE DNA AMP PROB: CPT | Performed by: NURSE PRACTITIONER

## 2024-08-28 PROCEDURE — 86780 TREPONEMA PALLIDUM: CPT | Performed by: NURSE PRACTITIONER

## 2024-08-28 PROCEDURE — 87210 SMEAR WET MOUNT SALINE/INK: CPT | Performed by: NURSE PRACTITIONER

## 2024-08-28 PROCEDURE — 87491 CHLMYD TRACH DNA AMP PROBE: CPT | Performed by: NURSE PRACTITIONER

## 2024-08-28 PROCEDURE — 99215 OFFICE O/P EST HI 40 MIN: CPT | Performed by: NURSE PRACTITIONER

## 2024-08-28 PROCEDURE — 87186 SC STD MICRODIL/AGAR DIL: CPT | Performed by: NURSE PRACTITIONER

## 2024-08-28 PROCEDURE — 36415 COLL VENOUS BLD VENIPUNCTURE: CPT | Performed by: NURSE PRACTITIONER

## 2024-08-28 PROCEDURE — 81001 URINALYSIS AUTO W/SCOPE: CPT | Performed by: NURSE PRACTITIONER

## 2024-08-28 PROCEDURE — 99417 PROLNG OP E/M EACH 15 MIN: CPT | Performed by: NURSE PRACTITIONER

## 2024-08-28 RX ORDER — PROCHLORPERAZINE MALEATE 10 MG
10 TABLET ORAL EVERY 8 HOURS PRN
Qty: 10 TABLET | Refills: 0 | Status: SHIPPED | OUTPATIENT
Start: 2024-08-28

## 2024-08-28 RX ORDER — QUETIAPINE FUMARATE 300 MG/1
300 TABLET, FILM COATED ORAL
COMMUNITY
Start: 2024-07-30 | End: 2024-08-28

## 2024-08-28 RX ORDER — BUPRENORPHINE HYDROCHLORIDE, NALOXONE HYDROCHLORIDE 8; 2 MG/1; MG/1
FILM, SOLUBLE BUCCAL; SUBLINGUAL
Status: CANCELLED | OUTPATIENT
Start: 2024-08-28

## 2024-08-28 RX ORDER — NALOXONE HYDROCHLORIDE 4 MG/.1ML
SPRAY NASAL
COMMUNITY
Start: 2024-07-19 | End: 2024-08-28

## 2024-08-28 RX ORDER — ECHINACEA PURPUREA EXTRACT 125 MG
1 TABLET ORAL DAILY PRN
Qty: 37.5 ML | Refills: 0 | Status: SHIPPED | OUTPATIENT
Start: 2024-08-28

## 2024-08-28 RX ORDER — GABAPENTIN 300 MG/1
300 CAPSULE ORAL 3 TIMES DAILY
Qty: 270 CAPSULE | Refills: 0 | Status: SHIPPED | OUTPATIENT
Start: 2024-08-28 | End: 2024-11-26

## 2024-08-28 RX ORDER — TRETINOIN 0.1 MG/G
GEL TOPICAL
COMMUNITY
Start: 2024-05-14

## 2024-08-28 RX ORDER — QUETIAPINE FUMARATE 100 MG/1
100 TABLET, FILM COATED ORAL 2 TIMES DAILY
Qty: 180 TABLET | Refills: 0 | Status: SHIPPED | OUTPATIENT
Start: 2024-08-28 | End: 2024-11-26

## 2024-08-28 RX ORDER — BUPRENORPHINE HYDROCHLORIDE, NALOXONE HYDROCHLORIDE 8; 2 MG/1; MG/1
FILM, SOLUBLE BUCCAL; SUBLINGUAL
COMMUNITY
End: 2024-08-28

## 2024-08-28 RX ORDER — QUETIAPINE FUMARATE 300 MG/1
300 TABLET, FILM COATED ORAL 2 TIMES DAILY
Qty: 180 TABLET | Refills: 0 | Status: SHIPPED | OUTPATIENT
Start: 2024-08-28 | End: 2024-11-26

## 2024-08-28 RX ORDER — NITROFURANTOIN 25; 75 MG/1; MG/1
100 CAPSULE ORAL 2 TIMES DAILY
Qty: 10 CAPSULE | Refills: 0 | Status: SHIPPED | OUTPATIENT
Start: 2024-08-28 | End: 2024-09-02

## 2024-08-28 RX ORDER — QUETIAPINE FUMARATE 100 MG/1
TABLET, FILM COATED ORAL
COMMUNITY
Start: 2024-05-14 | End: 2024-08-28

## 2024-08-28 RX ORDER — HYDROXYZINE HYDROCHLORIDE 25 MG/1
25-100 TABLET, FILM COATED ORAL
COMMUNITY
Start: 2024-07-30 | End: 2024-08-28

## 2024-08-28 RX ORDER — GABAPENTIN 300 MG/1
300 CAPSULE ORAL
COMMUNITY
Start: 2024-07-30 | End: 2024-08-28

## 2024-08-28 RX ORDER — METOCLOPRAMIDE 10 MG/1
10 TABLET ORAL 3 TIMES DAILY PRN
Qty: 10 TABLET | Refills: 0 | Status: SHIPPED | OUTPATIENT
Start: 2024-08-28

## 2024-08-28 RX ORDER — TRAZODONE HYDROCHLORIDE 50 MG/1
1 TABLET, FILM COATED ORAL AT BEDTIME
COMMUNITY
Start: 2024-05-01 | End: 2024-08-28

## 2024-08-28 RX ORDER — NALOXONE HYDROCHLORIDE 4 MG/.1ML
4 SPRAY NASAL
Qty: 2 EACH | Refills: 1 | Status: SHIPPED | OUTPATIENT
Start: 2024-08-28

## 2024-08-28 RX ORDER — ONDANSETRON 4 MG/1
4 TABLET, FILM COATED ORAL EVERY 8 HOURS PRN
Qty: 10 TABLET | Refills: 0 | Status: SHIPPED | OUTPATIENT
Start: 2024-08-28

## 2024-08-28 RX ORDER — PRENATAL VIT/IRON FUM/FOLIC AC 27MG-0.8MG
TABLET ORAL
COMMUNITY
Start: 2024-05-29 | End: 2024-08-28

## 2024-08-28 RX ORDER — TRAZODONE HYDROCHLORIDE 50 MG/1
50 TABLET, FILM COATED ORAL AT BEDTIME
Qty: 90 TABLET | Refills: 0 | Status: SHIPPED | OUTPATIENT
Start: 2024-08-28 | End: 2024-11-26

## 2024-08-28 RX ORDER — PRENATAL VIT/IRON FUM/FOLIC AC 27MG-0.8MG
1 TABLET ORAL DAILY
Qty: 90 TABLET | Refills: 3 | Status: SHIPPED | OUTPATIENT
Start: 2024-08-28

## 2024-08-28 RX ORDER — BUPRENORPHINE HYDROCHLORIDE, NALOXONE HYDROCHLORIDE 8; 2 MG/1; MG/1
1 FILM, SOLUBLE BUCCAL; SUBLINGUAL 3 TIMES DAILY
Qty: 90 FILM | Refills: 0 | Status: SHIPPED | OUTPATIENT
Start: 2024-08-28 | End: 2024-09-27

## 2024-08-28 RX ORDER — HYDROXYZINE HYDROCHLORIDE 25 MG/1
25-100 TABLET, FILM COATED ORAL EVERY 6 HOURS PRN
Qty: 30 TABLET | Refills: 1 | Status: SHIPPED | OUTPATIENT
Start: 2024-08-28

## 2024-08-28 RX ORDER — PROCHLORPERAZINE MALEATE 10 MG
1 TABLET ORAL EVERY 8 HOURS PRN
COMMUNITY
Start: 2024-07-16 | End: 2024-08-28

## 2024-08-28 NOTE — LETTER
August 28, 2024      Sydney BEAU Dayna  [address]  [place] [address]        To Whom It May Concern:    Sydney BEAU Dayna  was seen on 8/28/24.  Please excuse her  today for this appointment.         Sincerely,        VAHID RIZZO CNP

## 2024-08-28 NOTE — PROGRESS NOTES
Assessment & Plan   Patient requesting multiple medication refills. Patient reports has not been able to get in with her primary provider. I did recommend patient reach out to primary provider to discuss suboxone and whether she prescribes this medication. Patient is trying to get into MAT program     Vaginal discharge  Normal wet prep.     - Wet prep - Clinic Collect    Dysuria  UA consistent with possible UTI-based on symptoms I will treat today with antibiotic.    - UA Macroscopic with reflex to Microscopic and Culture - Lab Collect; Future  - UA Macroscopic with reflex to Microscopic and Culture - Lab Collect  - UA Microscopic with Reflex to Culture  - Urine Culture    Acute cystitis with hematuria  Awaiting culture-will treat today  - nitroFURantoin macrocrystal-monohydrate (MACROBID) 100 MG capsule; Take 1 capsule (100 mg) by mouth 2 times daily for 5 days.    Anxiety  Refilling psych meds for patient. Discussed with patient that I do not typically prescribe seroquel. Discussed I think she should see psych at least to review meds and ensure she is on appropriate medications. Patient agreeable to this. I have refilled for now-however future refills should come from psych or her primary.    - traZODone (DESYREL) 50 MG tablet; Take 1 tablet (50 mg) by mouth at bedtime.  - QUEtiapine (SEROQUEL) 300 MG tablet; Take 1 tablet (300 mg) by mouth 2 times daily.  - QUEtiapine (SEROQUEL) 100 MG tablet; Take 1 tablet (100 mg) by mouth 2 times daily.  - hydrOXYzine HCl (ATARAX) 25 MG tablet; Take 1-4 tablets ( mg) by mouth every 6 hours as needed for itching.  - gabapentin (NEURONTIN) 300 MG capsule; Take 1 capsule (300 mg) by mouth 3 times daily.  - Adult Mental Health  Referral; Future    Routine screening for STI (sexually transmitted infection)  Screening completed today.    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 I did review guidelines regarding medication and reviewed note from last refill. I do think the benefit of prescribing today to prevent relapse outweighs the risk. Patient understands I am only sending in for one month and needs to follow up with another provider.    - NARCAN 4 MG/0.1ML nasal spray; Spray 1 spray (4 mg) into one nostril alternating nostrils once as needed for opioid reversal. every 2-3 minutes until assistance arrives  - Adult Mental Health  Referral; Future  - SUBOXONE 8-2 MG per film; Place 1 Film under the tongue 3 times daily.    Post-nasal drip  Refilled for patient   - sodium chloride 0.65 % nasal spray; Spray 1 spray into both nostrils daily as needed for congestion.    Nausea and vomiting, unspecified vomiting type  Refilled for patient. I did discuss concern with taking these concurrently with seroquel. Patient reports she is not taking regularly-very infrequent.    - prochlorperazine (COMPAZINE) 10 MG tablet; Take 1 tablet (10 mg) by mouth every 8 hours as needed for nausea.  - ondansetron (ZOFRAN) 4 MG tablet; Take 1 tablet (4 mg) by mouth every 8 hours as needed for nausea.  - omeprazole (PRILOSEC) 20 MG DR capsule; Take 1 capsule (20 mg) by mouth as needed (gerd).  - metoclopramide (REGLAN) 10 MG tablet; Take 1 tablet (10 mg) by mouth 3 times daily as needed (headache or nausea).    Takes daily multivitamins with iron    - Prenatal Vit-Fe Fumarate-FA (PRENATAL MULTIVITAMIN W/IRON) 27-0.8 MG tablet; Take 1 tablet by mouth  "daily.          Nicotine/Tobacco Cessation  She reports that she has been smoking cigarettes. She has never used smokeless tobacco.  Nicotine/Tobacco Cessation Plan  Self help information given to patient      BMI  Estimated body mass index is 25.97 kg/m  as calculated from the following:    Height as of this encounter: 1.54 m (5' 0.63\").    Weight as of this encounter: 61.6 kg (135 lb 12.8 oz).         I spent a total of 60 minutes on the day of the visit.   Time spent by me doing chart review, history and exam, documentation and further activities per the note      Subjective   Sydney is a 24 year old, presenting for the following health issues:  STD (Burning and pain with urination and pain. Would like an STD test. Would possibly like to check for BV or yeast infection. Discharge is off color with a tint of blood.)      8/28/2024    11:14 AM   Additional Questions   Roomed by CR   Accompanied by N/A     History of Present Illness       Reason for visit:  Std check/medication refill   She is taking medications regularly.     Patient with history of opiod use disorder-specifically fentanyl. She just finished treatment. Patient reports she has been sober for 4 months. She is currently in sober living house. Requesting refills of multiple medications today that were prescribed while in inpatient treatment.     Had suboxone and gabapentin refilled for one month on 7/30.     Patient does see her primary provider Dr. Camarillo-patient unsure if provider does suboxone.                         Objective    /83 (BP Location: Right arm, Patient Position: Sitting, Cuff Size: Adult Regular)   Pulse 105   Temp 98.2  F (36.8  C) (Oral)   Resp 19   Ht 1.54 m (5' 0.63\")   Wt 61.6 kg (135 lb 12.8 oz)   SpO2 100%   Breastfeeding No   BMI 25.97 kg/m    Body mass index is 25.97 kg/m .  Physical Exam  Constitutional:       Appearance: Normal appearance.   Abdominal:      General: Abdomen is flat.   Genitourinary:     " Comments: Brown discharge   Neurological:      General: No focal deficit present.      Mental Status: She is alert and oriented to person, place, and time.   Psychiatric:         Mood and Affect: Mood normal.         Behavior: Behavior normal.            Results for orders placed or performed in visit on 08/28/24   UA Macroscopic with reflex to Microscopic and Culture - Lab Collect     Status: Abnormal    Specimen: Urine, Clean Catch   Result Value Ref Range    Color Urine Yellow Colorless, Straw, Light Yellow, Yellow    Appearance Urine Slightly Cloudy (A) Clear    Glucose Urine Negative Negative mg/dL    Bilirubin Urine Negative Negative    Ketones Urine Negative Negative mg/dL    Specific Gravity Urine >=1.030 1.005 - 1.030    Blood Urine Small (A) Negative    pH Urine 7.0 5.0 - 8.0    Protein Albumin Urine 100 (A) Negative mg/dL    Urobilinogen Urine 0.2 0.2, 1.0 E.U./dL    Nitrite Urine Negative Negative    Leukocyte Esterase Urine Small (A) Negative   UA Microscopic with Reflex to Culture     Status: Abnormal   Result Value Ref Range    Bacteria Urine Many (A) None Seen /HPF    RBC Urine 2-5 (A) 0-2 /HPF /HPF    WBC Urine 25-50 (A) 0-5 /HPF /HPF    Squamous Epithelials Urine Many (A) None Seen /LPF    WBC Clumps Urine Present (A) None Seen /HPF    Transitional Epithelials Urine Few (A) None Seen /HPF    Mucus Urine Present (A) None Seen /LPF   Wet prep - Clinic Collect     Status: Abnormal    Specimen: Vagina; Swab   Result Value Ref Range    Trichomonas Absent Absent    Yeast Absent Absent    Clue Cells Absent Absent    WBCs/high power field 1+ (A) None           Signed Electronically by: VAHID RIZZO CNP

## 2024-08-29 LAB
BACTERIA UR CULT: ABNORMAL
T PALLIDUM AB SER QL: NONREACTIVE

## 2024-09-22 ENCOUNTER — HEALTH MAINTENANCE LETTER (OUTPATIENT)
Age: 24
End: 2024-09-22

## 2024-10-09 ENCOUNTER — TELEPHONE (OUTPATIENT)
Dept: FAMILY MEDICINE | Facility: CLINIC | Age: 24
End: 2024-10-09

## 2024-10-09 ENCOUNTER — VIRTUAL VISIT (OUTPATIENT)
Dept: FAMILY MEDICINE | Facility: CLINIC | Age: 24
End: 2024-10-09
Payer: COMMERCIAL

## 2024-10-09 DIAGNOSIS — R39.9 URINARY TRACT INFECTION SYMPTOMS: Primary | ICD-10-CM

## 2024-10-09 DIAGNOSIS — N89.8 VAGINAL DISCHARGE: ICD-10-CM

## 2024-10-09 PROCEDURE — 99213 OFFICE O/P EST LOW 20 MIN: CPT | Mod: 95 | Performed by: FAMILY MEDICINE

## 2024-10-09 NOTE — PROGRESS NOTES
Sydney is a 24 year old who is being evaluated via a billable video visit.    How would you like to obtain your AVS? MyChart  If the video visit is dropped, the invitation should be resent by: Text to cell phone: [phone]  Will anyone else be joining your video visit? No      Assessment & Plan     Diagnoses and all orders for this visit:    Urinary tract infection symptoms, persistent despite recent UTI that grew E coli that was pan sensitive. Completed a course of Nitrofurantoin        -     Repeat Urinalysis  -     UA Macroscopic with reflex to Microscopic and Culture - Lab Collect; Future  -     Patient plans to schedule a lab only appointment. Will notify her with results and send in antibiotic if appropriate.     Vaginal discharge  -     Wet prep - lab collect; Future  -     Chlamydia trachomatis PCR; Future    Will notify patient with results and next steps.       Return in about 1 week (around 10/16/2024), or if symptoms worsen or fail to improve- inperson..      Subjective   Sydney is a 24 year old, presenting for the following health issues:  UTI and Vaginal Problem        10/9/2024     3:53 PM   Additional Questions   Roomed by Naida HUMPHRIES CMA         10/9/2024     3:53 PM   Patient Reported Additional Medications   Patient reports taking the following new medications suboxone       Video Start Time: 5:06 PM    HPI     Genitourinary - Female  Onset/Duration: x 1 months, was treated but still having symptoms   Description:   Painful urination (Dysuria): YES           Frequency: YES  Blood in urine (Hematuria): patient is unsure   Delay in urine (Hesitency): YES  Accompanying Signs & Symptoms:  Fever/chills: YES, sweating at night   Flank pain: YES  Nausea and vomiting: YES, nausea   Vaginal symptoms: discharge, odor, and itching  Abdominal/Pelvic Pain: YES  History:   History of frequent UTI s: YES- as a child but not as an adult  History of kidney stones: YES  Sexually Active: YES  Possibility of  pregnancy: No  Therapies tried and outcome: AZO and antibiotic     Recent UTI in 8/2024, Urine culture grew E coli. Treated with Nitrofurantoin.   Reports that the symptoms never resolved.     Now also having vaginal discharge. States that she has one sexual partner but fairly new and would like an STD check.     Recent STD panel on 8/28/24 was negative.     Component      Latest Ref Rng 8/28/2024  12:02 PM 8/28/2024  12:06 PM   Trichomonas      Absent  Absent     Yeast      Absent  Absent     Clue cells      Absent  Absent     WBCs/high power field      None  1+ !     Chlamydia Trachomatis PCR      Negative  Negative     Neisseria gonorrhoeae      Negative  Negative     HIV Antigen Antibody Combo      Nonreactive   Nonreactive    Hepatitis C Antibody      Nonreactive   Nonreactive    Treponema Antibodies      Nonreactive   Nonreactive           Review of Systems  Constitutional, HEENT, cardiovascular, pulmonary, gi and gu systems are negative, except as otherwise noted.      Objective           Vitals:  No vitals were obtained today due to virtual visit.    Physical Exam   GENERAL: alert and no distress  EYES: Eyes grossly normal to inspection.  No discharge or erythema, or obvious scleral/conjunctival abnormalities.  RESP: No audible wheeze, cough, or visible cyanosis.    SKIN: Visible skin clear. No significant rash, abnormal pigmentation or lesions.  NEURO: Cranial nerves grossly intact.  Mentation and speech appropriate for age.  PSYCH: Appropriate affect, tone, and pace of words    DATA  Labs ordered and pending at this time.         Video-Visit Details    Type of service:  Video Visit   Video End Time: 5:30 pm   Originating Location (pt. Location): Home  Distant Location (provider location):  On-site  Platform used for Video Visit: Chip  Signed Electronically by: nAnetta Jaimes MD

## 2024-10-09 NOTE — TELEPHONE ENCOUNTER
Last Depo-Provera Injection 07/30/2024 with [facility] [facility] ([facility] Depo-Provera injections due every 11 weeks, next injection due 10/16-11/13.     PCP is Dr. Olvera. Order is active in chart.     Patient scheduled for MA visit for Depo-Provera 10/16/24.

## 2024-10-16 ENCOUNTER — ALLIED HEALTH/NURSE VISIT (OUTPATIENT)
Dept: FAMILY MEDICINE | Facility: CLINIC | Age: 24
End: 2024-10-16
Payer: COMMERCIAL

## 2024-10-16 ENCOUNTER — LAB (OUTPATIENT)
Dept: LAB | Facility: CLINIC | Age: 24
End: 2024-10-16
Payer: COMMERCIAL

## 2024-10-16 DIAGNOSIS — Z32.00 ENCOUNTER FOR PREGNANCY TEST: Primary | ICD-10-CM

## 2024-10-16 DIAGNOSIS — Z32.00 ENCOUNTER FOR PREGNANCY TEST: ICD-10-CM

## 2024-10-16 DIAGNOSIS — N89.8 VAGINAL DISCHARGE: ICD-10-CM

## 2024-10-16 DIAGNOSIS — R39.9 URINARY TRACT INFECTION SYMPTOMS: ICD-10-CM

## 2024-10-16 LAB
ALBUMIN UR-MCNC: NEGATIVE MG/DL
APPEARANCE UR: CLEAR
BACTERIA #/AREA URNS HPF: ABNORMAL /HPF
BILIRUB UR QL STRIP: NEGATIVE
CLUE CELLS: PRESENT
COLOR UR AUTO: YELLOW
GLUCOSE UR STRIP-MCNC: NEGATIVE MG/DL
HCG UR QL: NEGATIVE
HGB UR QL STRIP: NEGATIVE
KETONES UR STRIP-MCNC: NEGATIVE MG/DL
LEUKOCYTE ESTERASE UR QL STRIP: ABNORMAL
MUCOUS THREADS #/AREA URNS LPF: PRESENT /LPF
NITRATE UR QL: NEGATIVE
PH UR STRIP: 7 [PH] (ref 5–8)
RBC #/AREA URNS AUTO: ABNORMAL /HPF
SP GR UR STRIP: 1.02 (ref 1–1.03)
SQUAMOUS #/AREA URNS AUTO: ABNORMAL /LPF
TRICHOMONAS, WET PREP: ABNORMAL
UROBILINOGEN UR STRIP-ACNC: 0.2 E.U./DL
WBC #/AREA URNS AUTO: ABNORMAL /HPF
WBC'S/HIGH POWER FIELD, WET PREP: ABNORMAL
YEAST, WET PREP: ABNORMAL

## 2024-10-16 PROCEDURE — 81001 URINALYSIS AUTO W/SCOPE: CPT

## 2024-10-16 PROCEDURE — 99207 PR NO CHARGE NURSE ONLY: CPT

## 2024-10-16 PROCEDURE — 81025 URINE PREGNANCY TEST: CPT

## 2024-10-16 PROCEDURE — 87491 CHLMYD TRACH DNA AMP PROBE: CPT

## 2024-10-16 PROCEDURE — 87210 SMEAR WET MOUNT SALINE/INK: CPT

## 2024-10-16 PROCEDURE — 87086 URINE CULTURE/COLONY COUNT: CPT

## 2024-10-16 PROCEDURE — 96372 THER/PROPH/DIAG INJ SC/IM: CPT | Performed by: FAMILY MEDICINE

## 2024-10-16 RX ADMIN — MEDROXYPROGESTERONE ACETATE 150 MG: 150 INJECTION, SUSPENSION INTRAMUSCULAR at 12:29

## 2024-10-16 NOTE — PROGRESS NOTES
Patient reports her last Depo injection was given on 7/30/24 given [facility] during inpatient treatment.    HCG test (results negative) completed in clinic today prior to  Depo injection, see MAR for injections details.

## 2024-10-17 LAB
BACTERIA UR CULT: NORMAL
C TRACH DNA SPEC QL NAA+PROBE: NEGATIVE

## 2024-10-18 ENCOUNTER — OFFICE VISIT (OUTPATIENT)
Dept: FAMILY MEDICINE | Facility: CLINIC | Age: 24
End: 2024-10-18
Payer: COMMERCIAL

## 2024-10-18 VITALS
BODY MASS INDEX: 25.82 KG/M2 | TEMPERATURE: 98.1 F | WEIGHT: 135 LBS | HEART RATE: 75 BPM | DIASTOLIC BLOOD PRESSURE: 62 MMHG | SYSTOLIC BLOOD PRESSURE: 114 MMHG | RESPIRATION RATE: 16 BRPM | OXYGEN SATURATION: 99 %

## 2024-10-18 DIAGNOSIS — Z11.3 SCREENING EXAMINATION FOR VENEREAL DISEASE: ICD-10-CM

## 2024-10-18 DIAGNOSIS — N89.8 VAGINAL IRRITATION: ICD-10-CM

## 2024-10-18 DIAGNOSIS — N76.0 BACTERIAL VAGINOSIS: ICD-10-CM

## 2024-10-18 DIAGNOSIS — B96.89 BACTERIAL VAGINOSIS: ICD-10-CM

## 2024-10-18 DIAGNOSIS — R39.9 UTI SYMPTOMS: Primary | ICD-10-CM

## 2024-10-18 LAB
CLUE CELLS: PRESENT
HIV 1+2 AB+HIV1 P24 AG SERPL QL IA: NONREACTIVE
T PALLIDUM AB SER QL: NONREACTIVE
TRICHOMONAS, WET PREP: ABNORMAL
WBC'S/HIGH POWER FIELD, WET PREP: ABNORMAL
YEAST, WET PREP: ABNORMAL

## 2024-10-18 PROCEDURE — 36415 COLL VENOUS BLD VENIPUNCTURE: CPT

## 2024-10-18 PROCEDURE — 87591 N.GONORRHOEAE DNA AMP PROB: CPT

## 2024-10-18 PROCEDURE — 99214 OFFICE O/P EST MOD 30 MIN: CPT

## 2024-10-18 PROCEDURE — 86780 TREPONEMA PALLIDUM: CPT

## 2024-10-18 PROCEDURE — 87491 CHLMYD TRACH DNA AMP PROBE: CPT

## 2024-10-18 PROCEDURE — 87210 SMEAR WET MOUNT SALINE/INK: CPT

## 2024-10-18 PROCEDURE — 87389 HIV-1 AG W/HIV-1&-2 AB AG IA: CPT

## 2024-10-18 RX ORDER — METRONIDAZOLE 500 MG/1
500 TABLET ORAL 2 TIMES DAILY
Qty: 14 TABLET | Refills: 0 | Status: SHIPPED | OUTPATIENT
Start: 2024-10-18 | End: 2024-10-25

## 2024-10-18 NOTE — PROGRESS NOTES
Assessment & Plan     (R39.9) UTI symptoms  (primary encounter diagnosis)  (N89.8) Vaginal irritation  Comment: Acute and worsening.  Patient was seen 2 days ago, and reviewed test results with her from outside provider as well as expected management, as she had not heard the plan moving forward.  Previous urinalysis indicated no concerns for active UTI.  Educated her that bacteria present was likely a collection from other genitalia rather than the urinary tract.  Culture did indicate only urogenital saranya, so explained these findings to patient, and the rationale for not moving forward with antibiotic use for UTI concerns.  Wet prep did indicate presence of clue cells, and patient does have a history of recurrent BV.  Would be wet prep today, and of course educated patient on treatment course with antibiotics for full eradication.  Patient verbalized that she would prefer oral metronidazole rather than intravaginal, so we will move forward with metronidazole 500 mg twice daily for 7 days.  There is also a great possibility that active STI could be contributing to ongoing irritation, so we will pan test today based on collaborative discussion. Offered education on medications including appropriate dosing, possible side effects, and possible adverse effects.  Education given on return to clinic instructions as well as alarm signs that would require the need for immediate medical attention.  Patient attested to understanding.  Plan: Vaginal-Chlamydia trachomatis/Neisseria         gonorrhoeae by PCR, HIV Antigen Antibody Combo         Cascade, Treponema Abs w Reflex to RPR and         Titer, Vagina-Wet preparation, Metrondazole 500mg PO    (Z11.3) Screening examination for venereal disease  Comment: Known exposure to STI, but patient was unexpectedly told that she was in a nonmonogamous relationship, which she was not aware of previously.  She does engage in unprotected sexual intercourse, so she has significant  concerns for STIs.  Was previously tested for chlamydia, which was negative, but interested in pan testing today.  Educated her briefly on treatment course for a number of STIs, and the importance of partner disclosure so that partners can be tested and treated appropriately as well if any of her results become positive. Offered education on medications including appropriate dosing, possible side effects, and possible adverse effects.  Education given on return to clinic instructions as well as alarm signs that would require the need for immediate medical attention.  Patient attested to understanding.  Plan: Vaginal-Chlamydia trachomatis/Neisseria         gonorrhoeae by PCR, HIV Antigen Antibody Combo         Cascade, Treponema Abs w Reflex to RPR and         Titer, Vagina-Wet preparation      This progress note has been dictated, with use of voice recognition software. Any grammatical, typographical, or context errors are unintentional and inherent to use of voice recognition software.     Independent interpretation of a test performed by another physician/other qualified health care professional (not separately reported) - wet prep and UA  Discussion of management or test interpretation with external physician/other qualified healthcare professional/appropriate source - wet prep and UA  Prescription drug management  I spent a total of 20 minutes on the day of the visit.   Time spent by me doing chart review, history and exam, documentation and further activities per the note    FUTURE APPOINTMENTS:       - Follow-up visit in 1 week if symptoms worsen or do not improve       - Follow-up for annual visit or as needed  Patient Instructions   As we discussed, the urine sample that you left 2 days ago shows no signs of urinary tract infection.  There are number of other things that could be causing the symptoms that you are experiencing, so we will test you today for bacterial vaginosis, vaginal yeast infection, and a  number of sexually transmitted infections.  Considering that we are testing you for STIs today, I would encourage you to abstain from sexual contact until these results are back.  Of course if any of them are positive, we will discuss the treatment plan moving forward, but if any of your sexually-transmitted infection results are positive, I would want you to let any of your sexual partners know this so that they can be tested and treated appropriately as well.  Please seek immediate medical attention with symptoms including severe abdominal pain, vomiting, flank pain, blood in the urine, severe pain with urination, fever, chills, body aches, chest pain, or shortness of breath    Safer Sex: Care Instructions  Overview  Safer sex is a way to reduce your risk of getting a sexually transmitted infection (STI). It can also help prevent pregnancy.  Several products can help you practice safer sex and reduce your chance of STIs. One of the best is a condom. There are internal and external condoms. You can use a special rubber sheet (dental dam) for protection during oral sex. Disposable gloves can keep your hands from touching blood, semen, or other body fluids that can carry infections.  Remember that birth control methods such as diaphragms, IUDs, foams, and birth control pills do not stop you from getting STIs.  Follow-up care is a key part of your treatment and safety. Be sure to make and go to all appointments, and call your doctor if you are having problems. It's also a good idea to know your test results and keep a list of the medicines you take.  How can you care for yourself at home?  Think about getting vaccinated to help prevent hepatitis A, hepatitis B, and human papillomavirus (HPV). They can be spread through sex.  Use a condom every time you have sex. Use an external condom, which goes on the penis. Or use an internal condom, which goes into the vagina or anus.  Make sure you use the right size external  "condom. A condom that's too small can break easily. A condom that's too big can slip off during sex.  Use a new condom each time you have sex. Be careful not to poke a hole in the condom when you open the wrapper.  Don't use an internal condom and an external condom at the same time.  Never use petroleum jelly (such as Vaseline), grease, hand lotion, baby oil, or anything with oil in it. These products can make holes in the condom.  After intercourse, hold the edge of the condom as you remove it. This will help keep semen from spilling out of the condom.  Do not have sex with anyone who has symptoms of an STI, such as sores on the genitals or mouth.  Do not drink a lot of alcohol or use drugs before sex.  Limit your sex partners. Sex with one partner who has sex only with you can reduce your risk of getting an STI.  Don't share sex toys. But if you do share them, use a condom and clean the sex toys between each use.  Talk to any partners before you have sex. Talk about what you feel comfortable with and whether you have any boundaries with sex. And find out if your partner or partners may be at risk for any STI. Keep in mind that a person may be able to spread an STI even if they do not have symptoms. You and any partners may want to get tested for STIs.  Where can you learn more?  Go to https://www.[employer].net/patiented  Enter B608 in the search box to learn more about \"Safer Sex: Care Instructions.\"  Current as of: November 27, 2023  Content Version: 14.2 2024 [facility] [employer].   Care instructions adapted under license by your healthcare professional. If you have questions about a medical condition or this instruction, always ask your healthcare professional. [employer] disclaims any warranty or liability for your use of this information.      Van Grimes is a 24 year old, presenting for the following health issues:  STD (Would like an STD testing. ) and UTI (Go over lab result. " "Pt states I had UTI a month ago, but I'm getting those symptoms back. Burning and frequent urination /)        10/18/2024    11:57 AM   Additional Questions   Roomed by Lawson Barrera   Accompanied by N/A         10/18/2024   Forms   Any forms needing to be completed Yes        History of Present Illness       Reason for visit:  Std testing   She is taking medications regularly.  Sydney is a 24-year-old female with a past medical history significant for curvature of the spine, dysmenorrhea, Depo-Provera contraceptive use, and chlamydia who presents today to follow-up after being seen in the clinic 2 days ago regarding discussion of test results, and for STI screening.  Patient reports that she presented to the clinic 2 days ago mainly for Depo-Provera injection.  She received this injection, but was also experiencing some vaginal and urinary symptoms that prompted a chlamydia test UA, and wet prep.  The urinalysis was unremarkable and noted only concerns for urogenital saranya, but the wet prep did indicate presence of clue cells.  Chlamydia and pregnancy test were both negative.  She declines that she received a call about these results, and has not received a treatment plan for them.  She does present today with concerns for worsening symptoms that include burning with urination, burning in the vaginal canal, urinary frequency and urgency, lower abdominal cramping, occasional bloody spotting in her underwear, and \"bumps\" in her genital area.  She has had a new sexual partner in the last 4 months who was recently found to be nonmonogamous with her, and this causes her concern for possible STI.  She has no known exposure to STIs, but notes that she does not use protection with sexual intercourse.  She does not get a regular menstrual cycle, especially when she is not on the Depo-Provera.  She is not utilizing this form of birth control, her cycle is very irregular, and she gets very heavy.  This with significant cramping " that can last between 2 and 3 weeks at a time.  Prior to her most recent Depo-Provera injection, she did have a lapse with her use of Depo-Provera which was leading to some breakthrough spotting.  She declines any spotting since the injection was given.  She does attest to occasional sweating and chills, but she notes that she is utilizing Suboxone, and did have a lapse in this medication.  She recently had this refilled, and feels that the symptoms are improving.  She declines any open sores or or draining lesions in her vaginal area, but does attest to a fluctuation in her vaginal discharge between creamy and thick to thin and increased, but notes that it always has a slight odor to it.    Review of Systems  Constitutional, HEENT, cardiovascular, pulmonary, gi and gu systems are negative, except as otherwise noted.      Objective    /62   Pulse 75   Temp 98.1  F (36.7  C) (Oral)   Resp 16   Wt 61.2 kg (135 lb)   SpO2 99%   BMI 25.82 kg/m    Body mass index is 25.82 kg/m .  Physical Exam   GENERAL: alert and no distress  EYES: Eyes grossly normal to inspection, PERRL and conjunctivae and sclerae normal  HENT: ear canals and TM's normal, nose and mouth without ulcers or lesions  NECK: no adenopathy, no asymmetry, masses, or scars  RESP: lungs clear to auscultation - no rales, rhonchi or wheezes  CV: regular rate and rhythm, normal S1 S2, no S3 or S4, no murmur, click or rub, no peripheral edema  ABDOMEN: soft, nontender, no hepatosplenomegaly, no masses and bowel sounds normal   (female): normal female external genitalia, normal urethral meatus, normal vaginal mucosa.  Vaginal canal without excoriation, moist, pink, and with mildly increased white vaginal discharge.  Whiff test positive  MS: no gross musculoskeletal defects noted, no edema    No results found for this or any previous visit (from the past 24 hour(s)).      Pinky Canada DNP FNP-C  Family Nurse Practitioner - Same Day  Provider  [facility] [place]    Signed Electronically by: VAHID Pimentel CNP

## 2024-10-18 NOTE — PATIENT INSTRUCTIONS
As we discussed, the urine sample that you left 2 days ago shows no signs of urinary tract infection.  There are number of other things that could be causing the symptoms that you are experiencing, so we will test you today for bacterial vaginosis, vaginal yeast infection, and a number of sexually transmitted infections.  Considering that we are testing you for STIs today, I would encourage you to abstain from sexual contact until these results are back.  Of course if any of them are positive, we will discuss the treatment plan moving forward, but if any of your sexually-transmitted infection results are positive, I would want you to let any of your sexual partners know this so that they can be tested and treated appropriately as well.  Please seek immediate medical attention with symptoms including severe abdominal pain, vomiting, flank pain, blood in the urine, severe pain with urination, fever, chills, body aches, chest pain, or shortness of breath    Safer Sex: Care Instructions  Overview  Safer sex is a way to reduce your risk of getting a sexually transmitted infection (STI). It can also help prevent pregnancy.  Several products can help you practice safer sex and reduce your chance of STIs. One of the best is a condom. There are internal and external condoms. You can use a special rubber sheet (dental dam) for protection during oral sex. Disposable gloves can keep your hands from touching blood, semen, or other body fluids that can carry infections.  Remember that birth control methods such as diaphragms, IUDs, foams, and birth control pills do not stop you from getting STIs.  Follow-up care is a key part of your treatment and safety. Be sure to make and go to all appointments, and call your doctor if you are having problems. It's also a good idea to know your test results and keep a list of the medicines you take.  How can you care for yourself at home?  Think about getting vaccinated to help prevent  "hepatitis A, hepatitis B, and human papillomavirus (HPV). They can be spread through sex.  Use a condom every time you have sex. Use an external condom, which goes on the penis. Or use an internal condom, which goes into the vagina or anus.  Make sure you use the right size external condom. A condom that's too small can break easily. A condom that's too big can slip off during sex.  Use a new condom each time you have sex. Be careful not to poke a hole in the condom when you open the wrapper.  Don't use an internal condom and an external condom at the same time.  Never use petroleum jelly (such as Vaseline), grease, hand lotion, baby oil, or anything with oil in it. These products can make holes in the condom.  After intercourse, hold the edge of the condom as you remove it. This will help keep semen from spilling out of the condom.  Do not have sex with anyone who has symptoms of an STI, such as sores on the genitals or mouth.  Do not drink a lot of alcohol or use drugs before sex.  Limit your sex partners. Sex with one partner who has sex only with you can reduce your risk of getting an STI.  Don't share sex toys. But if you do share them, use a condom and clean the sex toys between each use.  Talk to any partners before you have sex. Talk about what you feel comfortable with and whether you have any boundaries with sex. And find out if your partner or partners may be at risk for any STI. Keep in mind that a person may be able to spread an STI even if they do not have symptoms. You and any partners may want to get tested for STIs.  Where can you learn more?  Go to https://www.[employer].net/patiented  Enter B608 in the search box to learn more about \"Safer Sex: Care Instructions.\"  Current as of: November 27, 2023  Content Version: 14.2 2024 [facility] [employer].   Care instructions adapted under license by your healthcare professional. If you have questions about a medical condition or this instruction, " always ask your healthcare professional. [employer] disclaims any warranty or liability for your use of this information.

## 2024-10-19 LAB
C TRACH DNA SPEC QL PROBE+SIG AMP: NEGATIVE
N GONORRHOEA DNA SPEC QL NAA+PROBE: NEGATIVE

## 2025-02-27 ENCOUNTER — APPOINTMENT (OUTPATIENT)
Dept: ULTRASOUND IMAGING | Facility: HOSPITAL | Age: 25
End: 2025-02-27
Attending: EMERGENCY MEDICINE
Payer: COMMERCIAL

## 2025-02-27 ENCOUNTER — HOSPITAL ENCOUNTER (EMERGENCY)
Facility: HOSPITAL | Age: 25
Discharge: HOME | End: 2025-02-27
Attending: EMERGENCY MEDICINE
Payer: COMMERCIAL

## 2025-02-27 ENCOUNTER — APPOINTMENT (OUTPATIENT)
Dept: CT IMAGING | Facility: HOSPITAL | Age: 25
End: 2025-02-27
Attending: EMERGENCY MEDICINE
Payer: COMMERCIAL

## 2025-02-27 VITALS
WEIGHT: 130 LBS | SYSTOLIC BLOOD PRESSURE: 126 MMHG | HEIGHT: 63 IN | RESPIRATION RATE: 15 BRPM | HEART RATE: 77 BPM | TEMPERATURE: 97.7 F | DIASTOLIC BLOOD PRESSURE: 83 MMHG | OXYGEN SATURATION: 100 % | BODY MASS INDEX: 23.04 KG/M2

## 2025-02-27 DIAGNOSIS — R10.84 GENERALIZED ABDOMINAL PAIN: ICD-10-CM

## 2025-02-27 DIAGNOSIS — R11.2 NAUSEA AND VOMITING, UNSPECIFIED VOMITING TYPE: ICD-10-CM

## 2025-02-27 LAB
ALBUMIN SERPL BCG-MCNC: 5.2 G/DL (ref 3.5–5.2)
ALBUMIN UR-MCNC: 100 MG/DL
ALP SERPL-CCNC: 77 U/L (ref 40–150)
ALT SERPL W P-5'-P-CCNC: 12 U/L (ref 0–50)
ANION GAP SERPL CALCULATED.3IONS-SCNC: 19 MMOL/L (ref 7–15)
APPEARANCE UR: CLEAR
AST SERPL W P-5'-P-CCNC: 17 U/L (ref 0–45)
BACTERIA #/AREA URNS HPF: ABNORMAL /HPF
BASOPHILS # BLD AUTO: 0 10E3/UL (ref 0–0.2)
BASOPHILS NFR BLD AUTO: 0 %
BILIRUB DIRECT SERPL-MCNC: 0.17 MG/DL (ref 0–0.3)
BILIRUB SERPL-MCNC: 0.6 MG/DL
BILIRUB UR QL STRIP: NEGATIVE
BUN SERPL-MCNC: 12.7 MG/DL (ref 6–20)
CALCIUM SERPL-MCNC: 10.3 MG/DL (ref 8.8–10.4)
CHLORIDE SERPL-SCNC: 103 MMOL/L (ref 98–107)
COLOR UR AUTO: YELLOW
CREAT SERPL-MCNC: 0.62 MG/DL (ref 0.51–0.95)
EGFRCR SERPLBLD CKD-EPI 2021: >90 ML/MIN/1.73M2
EOSINOPHIL # BLD AUTO: 0 10E3/UL (ref 0–0.7)
EOSINOPHIL NFR BLD AUTO: 0 %
ERYTHROCYTE [DISTWIDTH] IN BLOOD BY AUTOMATED COUNT: 11.9 % (ref 10–15)
GLUCOSE SERPL-MCNC: 138 MG/DL (ref 70–99)
GLUCOSE UR STRIP-MCNC: 30 MG/DL
HCG UR QL: NEGATIVE
HCO3 SERPL-SCNC: 18 MMOL/L (ref 22–29)
HCT VFR BLD AUTO: 41.6 % (ref 35–47)
HGB BLD-MCNC: 15 G/DL (ref 11.7–15.7)
HGB UR QL STRIP: NEGATIVE
IMM GRANULOCYTES # BLD: 0.1 10E3/UL
IMM GRANULOCYTES NFR BLD: 0 %
KETONES UR STRIP-MCNC: 150 MG/DL
LEUKOCYTE ESTERASE UR QL STRIP: NEGATIVE
LIPASE SERPL-CCNC: 21 U/L (ref 13–60)
LYMPHOCYTES # BLD AUTO: 1.5 10E3/UL (ref 0.8–5.3)
LYMPHOCYTES NFR BLD AUTO: 11 %
MCH RBC QN AUTO: 30.1 PG (ref 26.5–33)
MCHC RBC AUTO-ENTMCNC: 36.1 G/DL (ref 31.5–36.5)
MCV RBC AUTO: 83 FL (ref 78–100)
MONOCYTES # BLD AUTO: 0.3 10E3/UL (ref 0–1.3)
MONOCYTES NFR BLD AUTO: 2 %
MUCOUS THREADS #/AREA URNS LPF: PRESENT /LPF
NEUTROPHILS # BLD AUTO: 11.7 10E3/UL (ref 1.6–8.3)
NEUTROPHILS NFR BLD AUTO: 86 %
NITRATE UR QL: NEGATIVE
NRBC # BLD AUTO: 0 10E3/UL
NRBC BLD AUTO-RTO: 0 /100
PH UR STRIP: 8.5 [PH] (ref 5–7)
PLATELET # BLD AUTO: 353 10E3/UL (ref 150–450)
POTASSIUM SERPL-SCNC: 3.7 MMOL/L (ref 3.4–5.3)
PROT SERPL-MCNC: 8.1 G/DL (ref 6.4–8.3)
RBC # BLD AUTO: 4.99 10E6/UL (ref 3.8–5.2)
RBC URINE: 2 /HPF
SODIUM SERPL-SCNC: 140 MMOL/L (ref 135–145)
SP GR UR STRIP: 1.03 (ref 1–1.03)
SQUAMOUS EPITHELIAL: 5 /HPF
UROBILINOGEN UR STRIP-MCNC: <2 MG/DL
WBC # BLD AUTO: 13.6 10E3/UL (ref 4–11)
WBC URINE: 3 /HPF

## 2025-02-27 PROCEDURE — 81001 URINALYSIS AUTO W/SCOPE: CPT | Performed by: EMERGENCY MEDICINE

## 2025-02-27 PROCEDURE — 96372 THER/PROPH/DIAG INJ SC/IM: CPT | Performed by: EMERGENCY MEDICINE

## 2025-02-27 PROCEDURE — 93976 VASCULAR STUDY: CPT | Mod: XS

## 2025-02-27 PROCEDURE — 83690 ASSAY OF LIPASE: CPT | Performed by: EMERGENCY MEDICINE

## 2025-02-27 PROCEDURE — 82310 ASSAY OF CALCIUM: CPT | Performed by: EMERGENCY MEDICINE

## 2025-02-27 PROCEDURE — 36415 COLL VENOUS BLD VENIPUNCTURE: CPT | Performed by: EMERGENCY MEDICINE

## 2025-02-27 PROCEDURE — 250N000011 HC RX IP 250 OP 636: Performed by: EMERGENCY MEDICINE

## 2025-02-27 PROCEDURE — 85048 AUTOMATED LEUKOCYTE COUNT: CPT | Performed by: EMERGENCY MEDICINE

## 2025-02-27 PROCEDURE — 76830 TRANSVAGINAL US NON-OB: CPT

## 2025-02-27 PROCEDURE — 87491 CHLMYD TRACH DNA AMP PROBE: CPT | Performed by: EMERGENCY MEDICINE

## 2025-02-27 PROCEDURE — 96361 HYDRATE IV INFUSION ADD-ON: CPT

## 2025-02-27 PROCEDURE — 74177 CT ABD & PELVIS W/CONTRAST: CPT

## 2025-02-27 PROCEDURE — 96375 TX/PRO/DX INJ NEW DRUG ADDON: CPT

## 2025-02-27 PROCEDURE — 258N000003 HC RX IP 258 OP 636: Performed by: EMERGENCY MEDICINE

## 2025-02-27 PROCEDURE — 96374 THER/PROPH/DIAG INJ IV PUSH: CPT | Mod: 59

## 2025-02-27 PROCEDURE — 85004 AUTOMATED DIFF WBC COUNT: CPT | Performed by: EMERGENCY MEDICINE

## 2025-02-27 PROCEDURE — 93005 ELECTROCARDIOGRAM TRACING: CPT | Performed by: EMERGENCY MEDICINE

## 2025-02-27 PROCEDURE — 82374 ASSAY BLOOD CARBON DIOXIDE: CPT | Performed by: EMERGENCY MEDICINE

## 2025-02-27 PROCEDURE — 82248 BILIRUBIN DIRECT: CPT | Performed by: EMERGENCY MEDICINE

## 2025-02-27 PROCEDURE — 81025 URINE PREGNANCY TEST: CPT | Performed by: EMERGENCY MEDICINE

## 2025-02-27 PROCEDURE — 99285 EMERGENCY DEPT VISIT HI MDM: CPT | Mod: 25

## 2025-02-27 RX ORDER — DICYCLOMINE HYDROCHLORIDE 10 MG/1
10 CAPSULE ORAL 3 TIMES DAILY PRN
Qty: 15 CAPSULE | Refills: 0 | Status: SHIPPED | OUTPATIENT
Start: 2025-02-27

## 2025-02-27 RX ORDER — METOCLOPRAMIDE HYDROCHLORIDE 5 MG/ML
10 INJECTION INTRAMUSCULAR; INTRAVENOUS ONCE
Status: COMPLETED | OUTPATIENT
Start: 2025-02-27 | End: 2025-02-27

## 2025-02-27 RX ORDER — PROMETHAZINE HYDROCHLORIDE 25 MG/1
25 TABLET ORAL EVERY 6 HOURS PRN
Qty: 15 TABLET | Refills: 0 | Status: SHIPPED | OUTPATIENT
Start: 2025-02-27

## 2025-02-27 RX ORDER — PROMETHAZINE HYDROCHLORIDE 25 MG/ML
25 INJECTION INTRAMUSCULAR; INTRAVENOUS ONCE
Status: COMPLETED | OUTPATIENT
Start: 2025-02-27 | End: 2025-02-27

## 2025-02-27 RX ORDER — ONDANSETRON 2 MG/ML
4 INJECTION INTRAMUSCULAR; INTRAVENOUS ONCE
Status: DISCONTINUED | OUTPATIENT
Start: 2025-02-27 | End: 2025-02-27

## 2025-02-27 RX ORDER — IOPAMIDOL 755 MG/ML
64 INJECTION, SOLUTION INTRAVASCULAR ONCE
Status: COMPLETED | OUTPATIENT
Start: 2025-02-27 | End: 2025-02-27

## 2025-02-27 RX ORDER — HYDROMORPHONE HYDROCHLORIDE 1 MG/ML
0.5 INJECTION, SOLUTION INTRAMUSCULAR; INTRAVENOUS; SUBCUTANEOUS ONCE
Status: COMPLETED | OUTPATIENT
Start: 2025-02-27 | End: 2025-02-27

## 2025-02-27 RX ORDER — KETOROLAC TROMETHAMINE 15 MG/ML
15 INJECTION, SOLUTION INTRAMUSCULAR; INTRAVENOUS ONCE
Status: COMPLETED | OUTPATIENT
Start: 2025-02-27 | End: 2025-02-27

## 2025-02-27 RX ORDER — DROPERIDOL 2.5 MG/ML
2.5 INJECTION, SOLUTION INTRAMUSCULAR; INTRAVENOUS ONCE
Status: COMPLETED | OUTPATIENT
Start: 2025-02-27 | End: 2025-02-27

## 2025-02-27 RX ADMIN — PROMETHAZINE HYDROCHLORIDE 25 MG: 25 INJECTION INTRAMUSCULAR; INTRAVENOUS at 20:11

## 2025-02-27 RX ADMIN — SODIUM CHLORIDE 1000 ML: 0.9 INJECTION, SOLUTION INTRAVENOUS at 15:57

## 2025-02-27 RX ADMIN — METOCLOPRAMIDE 10 MG: 5 INJECTION, SOLUTION INTRAMUSCULAR; INTRAVENOUS at 18:09

## 2025-02-27 RX ADMIN — PROCHLORPERAZINE EDISYLATE 10 MG: 5 INJECTION INTRAMUSCULAR; INTRAVENOUS at 15:59

## 2025-02-27 RX ADMIN — FAMOTIDINE 20 MG: 10 INJECTION, SOLUTION INTRAVENOUS at 20:11

## 2025-02-27 RX ADMIN — HYDROMORPHONE HYDROCHLORIDE 0.5 MG: 1 INJECTION, SOLUTION INTRAMUSCULAR; INTRAVENOUS; SUBCUTANEOUS at 15:59

## 2025-02-27 RX ADMIN — IOPAMIDOL 64 ML: 755 INJECTION, SOLUTION INTRAVENOUS at 18:29

## 2025-02-27 RX ADMIN — DROPERIDOL 2.5 MG: 2.5 INJECTION, SOLUTION INTRAMUSCULAR; INTRAVENOUS at 16:45

## 2025-02-27 RX ADMIN — KETOROLAC TROMETHAMINE 15 MG: 15 INJECTION, SOLUTION INTRAMUSCULAR; INTRAVENOUS at 15:59

## 2025-02-27 ASSESSMENT — ENCOUNTER SYMPTOMS
VOMITING: 1
HEMATURIA: 0
FEVER: 0
FREQUENCY: 0
DYSURIA: 0
SHORTNESS OF BREATH: 1
ABDOMINAL PAIN: 1

## 2025-02-27 ASSESSMENT — ACTIVITIES OF DAILY LIVING (ADL)
ADLS_ACUITY_SCORE: 41

## 2025-02-27 ASSESSMENT — COLUMBIA-SUICIDE SEVERITY RATING SCALE - C-SSRS
2. HAVE YOU ACTUALLY HAD ANY THOUGHTS OF KILLING YOURSELF IN THE PAST MONTH?: NO
6. HAVE YOU EVER DONE ANYTHING, STARTED TO DO ANYTHING, OR PREPARED TO DO ANYTHING TO END YOUR LIFE?: NO
1. IN THE PAST MONTH, HAVE YOU WISHED YOU WERE DEAD OR WISHED YOU COULD GO TO SLEEP AND NOT WAKE UP?: NO

## 2025-02-27 NOTE — ED PROVIDER NOTES
EMERGENCY DEPARTMENT ENCOUNTER      NAME: Sydney Mcintosh  AGE: 24 year old female  YOB: 2000  MRN: [id number]  EVALUATION DATE & TIME: No admission date for patient encounter.    PCP: Jennifer Olvera    ED PROVIDER: Darien Lazcano DO      Chief Complaint   Patient presents with    Abdominal Pain    Vomiting         FINAL IMPRESSION:  1. Generalized abdominal pain    2. 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  The patient was slightly tachycardic upon arrival.  She was otherwise stable.  The patient was uncomfortable appearing.  On exam the patient was noted to have diffuse abdominal pain.  The patient did not have evidence of an acute abdomen, however.  Remainder of her physical exam was unremarkable.    Following initial evaluation the patient was given IV fluids, Compazine, Toradol, and Dilaudid.    The patient's white blood cell count was 13.6.  Remainder of the CBC was unremarkable.  CMP labs were unremarkable other than showing mild acidosis which is likely due to her vomiting episodes.  Lipase was normal.  UA did not show evidence of urinary tract infection.  hCG testing was negative.    Patient continued to report persistent nausea and vomiting.  Patient was then given droperidol and Reglan.    CT scan of the abdomen was nondiagnostic other than noting a 3 cm right ovarian cyst.    The patient was again reevaluated and informed of the CT scan results.  Patient reports persistent pain and nausea at the time reevaluation.  Patient was given IM Phenergan as well as IV famotidine.      A pelvic ultrasound was obtained to further evaluate the right ovarian cyst.  The pelvic ultrasound was unremarkable and did not show evidence of ovarian torsion.    The patient was reevaluated and informed of the reassuring ultrasound  results.  The exact etiology for her lower abdominal pain is unclear.  Patient was asked about the possibility of PID.  Swabs were obtained for both GC and chlamydia.  Patient was informed that she would be contacted for any positive culture results within the next 1 to 2 days.  Following this the patient felt comfortable returning home even though she continued to experience persistent nausea and abdominal discomfort.  The patient was sent home with additional oral Phenergan to use as needed for any further episodes of nausea or vomiting.  She was also sent home with Bentyl to treat the possibility of cramps.  The patient was instructed to follow-up with her primary care provider for reevaluation or to return back to ED sooner for any worsening abdominal pain, vomiting, or any other new or concerning symptoms.    Pertinent Labs & Imaging studies reviewed. (See chart for details)  2:55 PM I met with the patient to gather history and to perform my initial exam. We discussed plans for the ED course, including diagnostic testing and treatment.       At the conclusion of the encounter I discussed the results of all of the tests and the disposition. The questions were answered. The patient or family acknowledged understanding and was agreeable with the care plan.     Medical Decision Making    History:  Supplemental history from: N/A  External Record(s) reviewed: Documented in chart    Work Up:  Chart documentation includes differential considered and any EKGs or imaging independently interpreted by provider, where specified.  In additional to work up documented, I considered the following work up: Documented in chart, if applicable.    External consultation:  Discussion of management with another provider: Documented in chart, if applicable    Complicating factors:  Care impacted by chronic illness: Documented in Chart  Care affected by social determinants of health: N/A    Disposition considerations: Discharge. I  prescribed additional prescription strength medication(s) as charted. See documentation for any additional details.    Not Applicable        PPE worn: n95 mask, goggles    MEDICATIONS GIVEN IN THE EMERGENCY:  Medications   ketorolac (TORADOL) injection 15 mg (15 mg Intravenous $Given 2/27/25 1559)   sodium chloride 0.9% BOLUS 1,000 mL (0 mLs Intravenous Stopped 2/27/25 1646)   prochlorperazine (COMPAZINE) injection 10 mg (10 mg Intravenous $Given 2/27/25 1559)   HYDROmorphone (PF) (DILAUDID) injection 0.5 mg (0.5 mg Intravenous $Given 2/27/25 1559)   droPERidol (INAPSINE) injection 2.5 mg (2.5 mg Intravenous $Given 2/27/25 1645)   metoclopramide (REGLAN) injection 10 mg (10 mg Intravenous $Given 2/27/25 1809)   iopamidol (ISOVUE-370) solution 64 mL (64 mLs Intravenous $Given 2/27/25 1829)   famotidine (PEPCID) injection 20 mg (20 mg Intravenous $Given 2/27/25 2011)   promethazine (PHENERGAN) intraMUSCULAR injection 25 mg (25 mg Intramuscular $Given 2/27/25 2011)       NEW PRESCRIPTIONS STARTED AT TODAY'S ER VISIT  Discharge Medication List as of 2/27/2025  9:43 PM        START taking these medications    Details   dicyclomine (BENTYL) 10 MG capsule Take 1 capsule (10 mg) by mouth 3 times daily as needed (abdominal pain)., Disp-15 capsule, R-0, E-Prescribe      promethazine (PHENERGAN) 25 MG tablet Take 1 tablet (25 mg) by mouth every 6 hours as needed for nausea or vomiting., Disp-15 tablet, R-0, E-Prescribe                =================================================================    HPI    Patient information was obtained from: Patient    Use of : N/A         Sydney Mcintosh is a 24 year old female with a pertinent history of dysmenorrhea who presents to this ED by private vehicle for evaluation of abdominal pain and vomiting.    The patient endorses burning bilateral lower abdominal pain since this morning that gradually worsened after she vomited. She reports that it is hard to tell which side  of her abdomen hurts more because the pain is so severe. Patient describes that the abdominal pain radiates to her back and also endorses chest pain and shortness of breath. No urinary symptoms, fevers, abnormal vaginal bleeding or vaginal discharge. Her bowel movements have been normal. She has never had this pain in the past and has not taken any pain medications today. No history of abdominal surgeries or ovarian cysts. Her LMP was 4 days ago (2/23/2025). She notes that her mom has a history of endometriosis. Patient has no other concerns at this time.       REVIEW OF SYSTEMS   Review of Systems   Constitutional:  Negative for fever.   Respiratory:  Positive for shortness of breath.    Cardiovascular:  Positive for chest pain.   Gastrointestinal:  Positive for abdominal pain (bilateral, lower) and vomiting.   Genitourinary:  Negative for dysuria, frequency, hematuria, urgency, vaginal bleeding and vaginal discharge.   All other systems reviewed and are negative.       PAST MEDICAL HISTORY:  Past Medical History:   Diagnosis Date    Cervical high risk HPV (human papillomavirus) test positive 12/22/2020 12/22/20 NIL pap, +HR HPV (not 16/18) @ 19 yo. HPV testing not indicated. Pap in 3 years    Chlamydia infection 05/2018       PAST SURGICAL HISTORY:  Past Surgical History:   Procedure Laterality Date    TONSILLECTOMY  2002           CURRENT MEDICATIONS:    dicyclomine (BENTYL) 10 MG capsule  promethazine (PHENERGAN) 25 MG tablet  acetaminophen (TYLENOL) 325 MG tablet  adapalene (DIFFERIN) 0.3 % external gel  ammonium lactate (LAC-HYDRIN) 12 % lotion  cetirizine (ZYRTEC) 10 MG tablet  clindamycin (CLEOCIN T) 1 % external solution  gabapentin (NEURONTIN) 300 MG capsule  hydrOXYzine HCl (ATARAX) 25 MG tablet  ibuprofen (ADVIL/MOTRIN) 200 MG tablet  medroxyPROGESTERone (DEPO-PROVERA) 150 MG/ML IM injection  metoclopramide (REGLAN) 10 MG tablet  NARCAN 4 MG/0.1ML nasal spray  omeprazole (PRILOSEC) 20 MG   "capsule  ondansetron (ZOFRAN) 4 MG tablet  Prenatal Vit-Fe Fumarate-FA (PRENATAL MULTIVITAMIN W/IRON) 27-0.8 MG tablet  prochlorperazine (COMPAZINE) 10 MG tablet  QUEtiapine (SEROQUEL) 100 MG tablet  QUEtiapine (SEROQUEL) 300 MG tablet  sodium chloride 0.65 % nasal spray  traZODone (DESYREL) 50 MG tablet        ALLERGIES:  Allergies   Allergen Reactions    Amoxicillin Rash       FAMILY HISTORY:  Family History   Problem Relation Age of Onset    Endometriosis Mother     Thyroid Disease Maternal Grandmother        SOCIAL HISTORY:   Social History     Socioeconomic History    Marital status: Single     Spouse name: None    Number of children: None    Years of education: None    Highest education level: None   Tobacco Use    Smoking status: Every Day     Types: Cigarettes    Smokeless tobacco: Never    Tobacco comments:     Vapes   Vaping Use    Vaping status: Some Days    Substances: Nicotine    Devices: Disposable   Substance and Sexual Activity    Alcohol use: No    Drug use: No    Sexual activity: Yes     Partners: Male     Birth control/protection: Injection   Social History Narrative    Lives at home     Social Drivers of Health     Interpersonal Safety: Low Risk  (8/28/2024)    Interpersonal Safety     Do you feel physically and emotionally safe where you currently live?: Yes     Within the past 12 months, have you been hit, slapped, kicked or otherwise physically hurt by someone?: No     Within the past 12 months, have you been humiliated or emotionally abused in other ways by your partner or ex-partner?: No       VITALS:  /83   Pulse 77   Temp 97.7  F (36.5  C) (Temporal)   Resp 15   Ht 1.6 m (5' 3\")   Wt 59 kg (130 lb)   LMP 02/24/2025   SpO2 100%   BMI 23.03 kg/m      PHYSICAL EXAM    General presentation: Alert, Vital signs reviewed. NAD, uncomfortable appearing.   HENT: ENT inspection is normal. Oropharynx is moist and clear.   Eye: Pupils are equal and reactive to light. EOMI  Neck: The neck " is supple, with full ROM, with no evidence of meningismus.  Pulmonary: Currently in no acute respiratory distress. Normal, non labored respirations, the lung sounds are normal with good equal air movement. Clear to auscultation bilaterally.   Circulatory: Regular rate and rhythm. Peripheral pulses are strong and equal. No murmurs, rubs, or gallops.   Abdominal: The abdomen is soft. Mild/moderate diffuse tenderness to palpation. No rigidity, guarding, or rebound. Bowel sounds normal.   Neurologic: Alert, oriented to person, place, and time. No motor deficit. No sensory deficit. Cranial nerves II through XII are intact.  Musculoskeletal: No extremity tenderness. Full range of motion in all extremities. No extremity edema.   Skin: Skin color is normal. No rash. Warm. Dry to touch.      LAB:  All pertinent labs reviewed and interpreted.  Results for orders placed or performed during the hospital encounter of 02/27/25   CT Abdomen Pelvis w Contrast    Impression    IMPRESSION:   1.  Normal appendix.    2.  3.1 cm cyst right ovary. No significant free fluid in the pelvis.     US Pelvis Cmplt w Transvag & Doppler LmtPel Duplex Limited    Impression    IMPRESSION:    1.  Unilocular simple cyst in the right ovary measuring 3.0 cm. No additional imaging follow-up needed.  2.  Otherwise normal pelvic ultrasound.    REFERENCE:  Management of Asymptomatic Ovarian and Other Adnexal Cysts Imaged at US: Society of Radiologists in Ultrasound Consensus Conference Statement. Radiology September 2010; [id number].    Simple Adnexal Cysts: SRU Consensus Conference Update on Follow-up and Reporting. Radiology September 2019. [id number].   Comprehensive metabolic panel   Result Value Ref Range    Sodium 140 135 - 145 mmol/L    Potassium 3.7 3.4 - 5.3 mmol/L    Carbon Dioxide (CO2) 18 (L) 22 - 29 mmol/L    Anion Gap 19 (H) 7 - 15 mmol/L    Urea Nitrogen 12.7 6.0 - 20.0 mg/dL    Creatinine 0.62 0.51 - 0.95 mg/dL    GFR Estimate >90 >60  mL/min/1.73m2    Calcium 10.3 8.8 - 10.4 mg/dL    Chloride 103 98 - 107 mmol/L    Glucose 138 (H) 70 - 99 mg/dL    Alkaline Phosphatase 77 40 - 150 U/L    AST 17 0 - 45 U/L    ALT 12 0 - 50 U/L    Protein Total 8.1 6.4 - 8.3 g/dL    Albumin 5.2 3.5 - 5.2 g/dL    Bilirubin Total 0.6 <=1.2 mg/dL   Result Value Ref Range    Lipase 21 13 - 60 U/L   HCG qualitative urine   Result Value Ref Range    hCG Urine Qualitative Negative Negative   UA with Microscopic reflex to Culture    Specimen: Urine, Clean Catch   Result Value Ref Range    Color Urine Yellow Colorless, Straw, Light Yellow, Yellow    Appearance Urine Clear Clear    Glucose Urine 30 (A) Negative mg/dL    Bilirubin Urine Negative Negative    Ketones Urine 150 (A) Negative mg/dL    Specific Gravity Urine 1.029 1.001 - 1.030    Blood Urine Negative Negative    pH Urine 8.5 (H) 5.0 - 7.0    Protein Albumin Urine 100 (A) Negative mg/dL    Urobilinogen Urine <2.0 <2.0 mg/dL    Nitrite Urine Negative Negative    Leukocyte Esterase Urine Negative Negative    Bacteria Urine Few (A) None Seen /HPF    Mucus Urine Present (A) None Seen /LPF    RBC Urine 2 <=2 /HPF    WBC Urine 3 <=5 /HPF    Squamous Epithelials Urine 5 (H) <=1 /HPF   Result Value Ref Range    Bilirubin Direct 0.17 0.00 - 0.30 mg/dL   CBC with platelets and differential   Result Value Ref Range    WBC Count 13.6 (H) 4.0 - 11.0 10e3/uL    RBC Count 4.99 3.80 - 5.20 10e6/uL    Hemoglobin 15.0 11.7 - 15.7 g/dL    Hematocrit 41.6 35.0 - 47.0 %    MCV 83 78 - 100 fL    MCH 30.1 26.5 - 33.0 pg    MCHC 36.1 31.5 - 36.5 g/dL    RDW 11.9 10.0 - 15.0 %    Platelet Count 353 150 - 450 10e3/uL    % Neutrophils 86 %    % Lymphocytes 11 %    % Monocytes 2 %    % Eosinophils 0 %    % Basophils 0 %    % Immature Granulocytes 0 %    NRBCs per 100 WBC 0 <1 /100    Absolute Neutrophils 11.7 (H) 1.6 - 8.3 10e3/uL    Absolute Lymphocytes 1.5 0.8 - 5.3 10e3/uL    Absolute Monocytes 0.3 0.0 - 1.3 10e3/uL    Absolute Eosinophils  0.0 0.0 - 0.7 10e3/uL    Absolute Basophils 0.0 0.0 - 0.2 10e3/uL    Absolute Immature Granulocytes 0.1 <=0.4 10e3/uL    Absolute NRBCs 0.0 10e3/uL       RADIOLOGY:  Reviewed all pertinent imaging. Please see official radiology report.  US Pelvis Cmplt w Transvag & Doppler LmtPel Duplex Limited   Final Result   IMPRESSION:     1.  Unilocular simple cyst in the right ovary measuring 3.0 cm. No additional imaging follow-up needed.   2.  Otherwise normal pelvic ultrasound.      REFERENCE:   Management of Asymptomatic Ovarian and Other Adnexal Cysts Imaged at US: Society of Radiologists in Ultrasound Consensus Conference Statement. Radiology September 2010; [id number].      Simple Adnexal Cysts: U Consensus Conference Update on Follow-up and Reporting. Radiology September 2019. [id number].      CT Abdomen Pelvis w Contrast   Final Result   IMPRESSION:    1.  Normal appendix.      2.  3.1 cm cyst right ovary. No significant free fluid in the pelvis.             EKG:    Normal sinus rhythm.  Rate of 80.  Normal QRS.  Normal QT.  No ST or T wave changes.  No previous EKG available for comparison.    I have independently reviewed and interpreted the EKG(s) documented above.        I, Joel Irwin, am serving as a scribe to document services personally performed by Darien Lazcano DO based on my observation and the provider's statements to me. I, Darien Lazcano, attest that Joel Irwin is acting in a scribe capacity, has observed my performance of the services and has documented them in accordance with my direction.    Darien Lazcano DO  Emergency Medicine  [facility] EMERGENCY DEPARTMENT  [facility] [facility] [address]  [phone]       Darien Lazcano DO  02/27/25 1819

## 2025-02-27 NOTE — Clinical Note
Esdras accompanied Sydney Mcintosh to the emergency department on 2/27/2025. They may return to work on 02/28/2025.      If you have any questions or concerns, please don't hesitate to call.      Darien Lazcano, DO

## 2025-02-27 NOTE — Clinical Note
Sydney Mcintosh was seen and treated in our emergency department on 2/27/2025.  She may return to work on 03/03/2025.       If you have any questions or concerns, please don't hesitate to call.      Darien Lazcaon, DO

## 2025-02-27 NOTE — ED TRIAGE NOTES
Pt presents with low abd pain and nausea/vomiting that started this am. Pt reports no fever but urinary frequency. Pt is moaning and grunting in triage. Encouraged to deep breath, slowly. Pt is hyperventilating.     Triage Assessment (Adult)       Row Name 02/27/25 1407          Triage Assessment    Airway WDL WDL        Respiratory WDL    Respiratory WDL WDL        Skin Circulation/Temperature WDL    Skin Circulation/Temperature WDL WDL        Cardiac WDL    Cardiac WDL WDL        Peripheral/Neurovascular WDL    Peripheral Neurovascular WDL WDL        Cognitive/Neuro/Behavioral WDL    Cognitive/Neuro/Behavioral WDL X;mood/behavior     Mood/Behavior anxious;restless;cooperative

## 2025-02-28 LAB
ATRIAL RATE - MUSE: 80 BPM
C TRACH DNA SPEC QL PROBE+SIG AMP: NEGATIVE
DIASTOLIC BLOOD PRESSURE - MUSE: 76 MMHG
INTERPRETATION ECG - MUSE: NORMAL
N GONORRHOEA DNA SPEC QL NAA+PROBE: NEGATIVE
P AXIS - MUSE: 63 DEGREES
PR INTERVAL - MUSE: 128 MS
QRS DURATION - MUSE: 78 MS
QT - MUSE: 420 MS
QTC - MUSE: 484 MS
R AXIS - MUSE: 97 DEGREES
SPECIMEN TYPE: NORMAL
SYSTOLIC BLOOD PRESSURE - MUSE: 135 MMHG
T AXIS - MUSE: 56 DEGREES
VENTRICULAR RATE- MUSE: 80 BPM

## 2025-02-28 NOTE — DISCHARGE INSTRUCTIONS
The pelvic ultrasound, abdominal CT scan, urinalysis, and all other laboratory test appear reassuring here tonight in the emergency department.     Take the prescribed Phenergan as needed for any further episodes of nausea or vomiting.  Use the bentyl as needed for any further episodes of abdominal pain in addition to over-the-counter ibuprofen.      Follow-up with your primary care provider for reevaluation.  Return back to ED sooner for any worsening pain or any other new or concerning symptoms.

## 2025-02-28 NOTE — ED NOTES
AVS discussed with Pt, medications instructions provided, questions encouraged and answered, Pt discharged home.

## 2025-03-02 ENCOUNTER — TELEPHONE (OUTPATIENT)
Dept: NURSING | Facility: CLINIC | Age: 25
End: 2025-03-02
Payer: COMMERCIAL

## 2025-03-02 NOTE — TELEPHONE ENCOUNTER
Chlamydia trachomatic PCR and N.Gonorrhea is negative/within normal limits.     No treatment or change in treatment recommended per [facility] ED Lab Result  protocol.     ROMAINE SRINIVASAN RN